# Patient Record
Sex: FEMALE | Race: WHITE | Employment: UNEMPLOYED | URBAN - METROPOLITAN AREA
[De-identification: names, ages, dates, MRNs, and addresses within clinical notes are randomized per-mention and may not be internally consistent; named-entity substitution may affect disease eponyms.]

---

## 2017-07-21 ENCOUNTER — HOSPITAL ENCOUNTER (OUTPATIENT)
Dept: SURGERY | Age: 57
Discharge: HOME | End: 2017-07-21
Payer: MEDICARE

## 2017-07-21 VITALS
HEIGHT: 67 IN | HEART RATE: 84 BPM | TEMPERATURE: 98.1 F | DIASTOLIC BLOOD PRESSURE: 63 MMHG | SYSTOLIC BLOOD PRESSURE: 96 MMHG | OXYGEN SATURATION: 95 % | WEIGHT: 244 LBS | BODY MASS INDEX: 38.3 KG/M2 | RESPIRATION RATE: 14 BRPM

## 2017-07-21 LAB
EST. AVERAGE GLUCOSE BLD GHB EST-MCNC: 123 MG/DL
GLUCOSE BLD STRIP.AUTO-MCNC: 103 MG/DL (ref 65–100)
HBA1C MFR BLD: 5.9 % (ref 4.8–6)
HGB BLD-MCNC: 14.1 G/DL (ref 11.7–15.4)
POTASSIUM SERPL-SCNC: 3.6 MMOL/L (ref 3.5–5.1)

## 2017-07-21 PROCEDURE — 85018 HEMOGLOBIN: CPT | Performed by: ANESTHESIOLOGY

## 2017-07-21 PROCEDURE — 84132 ASSAY OF SERUM POTASSIUM: CPT | Performed by: ANESTHESIOLOGY

## 2017-07-21 PROCEDURE — 82962 GLUCOSE BLOOD TEST: CPT

## 2017-07-21 PROCEDURE — 83036 HEMOGLOBIN GLYCOSYLATED A1C: CPT | Performed by: ORTHOPAEDIC SURGERY

## 2017-07-21 RX ORDER — AZITHROMYCIN 250 MG/1
250 TABLET, FILM COATED ORAL DAILY
COMMUNITY
End: 2017-08-17 | Stop reason: CLARIF

## 2017-07-21 NOTE — PERIOP NOTES
Patient verified name, , and surgery as listed in [facility]. TYPE  CASE:1b  Orders per surgeon: yes Received  Labs per surgeon:[id number]: results -  Labs per anesthesia protocol: potassium,hgb, poc glucose : results -  EKG  :  na    Incentive spirometer given with instructions on use. Patient provided with handouts including guide to surgery , transfusions, pain management and hand hygiene for the family and community. Pt verbalizes understanding of all pre-op instructions . Instructed that family must be present in building at all times. Nothing to eat or drink after midnight the night prior to surgery. hibiclens and instructions given per hospital policy. Instructed patient to continue  previous medications as prescribed prior to surgery and  to take the following medications the day of surgery according to anesthesia guidelines : advair,albuterol,klonopin as needed,gabapentin,ms continor percocet,omeprazole,effexor       Original medication prescription bottles none visualized during patient appointment. Continue all previous medications unless otherwise directed. Instructed patient to hold  the following medications prior to surgery: none      Patient verbalized understanding of all instructions and provided all medical/health information to the best of their ability.

## 2017-07-21 NOTE — H&P
Chief Compliant:  Back pain    History of present illness: This is a very pleasant 62year old patient who presents with a Four-month history of back pain with infrequent radiation to the buttocks or lower extremity. The onset of the symptoms was rather Just before Easter she had thoracic pain. She sees Dr. Larry Beavers for pain management for some chronic low back pain. He did x-ray of the thoracic spine and this revealed a T6 compression fracture. This was confirmed with an MRI scan of the lumbar spine. He placed her in a TLSO brace and treated this with pain medicine increased her morphine to 30 mg. Pain worsened. Repeat MRI scan and x-rays revealed a new T5 compression fracture as well. This last MRI was in June. Pain has persisted, patient fell in the bathtub 2 weeks ago. Her pain worsened. She comes in today for further evaluation. She denies radicular leg pain any worse since her recent falls. She does have some chronic lower back pain. Denies bladder or bowel incontinence. .   . Pain is rated 10/10 on the Visual Analog Scale. Thus far, the patient has tried morphine, brace, Neurontin. PMHx/PSHx/Social History/Medications/Allergies are listed and have been reviewed. Review of systems was noted. Pertinent positives and negatives were discussed with the patient particularly those that related to musculoskeletal complaints. Nonorthopedic complaints were directed to the primary care physician. Medications:  ????? ClonazePAM (2 MG);Diclofenac Sodium (50 MG); Fluconazole (150 MG);Gabapentin (600 MG); Levocetirizine Dihydrochloride (5 MG);MetFORMIN HCl (500 MG); Morphine Sulfate ER (30 MG); Omeprazole (20 MG); Oxycodone-Acetaminophen ( MG); Simvastatin (40 MG); TiZANidine HCl (4 MG); Venlafaxine HCl ER (150 MG)    Allergies:  ????? Doxycycline;TraMADol HCl    Physical Exam:  GENERAL: Well developed, well-nourished adult in no acute distress.  Patient is appropriately conversant  EYES:  Pink conjunctivae, Sclera clear. No ptosis. ENT:  Nose and ears appear normal.  Mucous membranes moist   NECK:  Non-tender. No masses. Full range of motion. RESPIRATORY:  Normal respiratory effort. Lungs are crackles bilateral bases. CARDIOVASCULAR:  Pulse regular. No peripheral edema. Audible S1S2. No carotid bruits. The feet are warm with good capillary refill and palpable pedal pulses. GI:  Abdomen soft, non-tender, non-distended. SKIN:  No visible rashes, ulcers or lesions. Normal turgor and temperature   EXTREMITIES:  Warm and well perfused. No cyanosis or clubbing. MSK:   Examination of the thoracic spine reveals kyphosis or coronal plane deformity. Flexion is limited by pain more so than extension. There is significant tenderness to palpation along the spinous processes or paraspinal musculature. The patient ambulates with a mildly antalgic gait. She is in a wheelchair due to pain. Straight leg testing reproduces some back pain. There is minimal hip irritability with internal or external rotation bilaterally. NEURO:Sensory testing reveals intact sensation to light touch and in the distribution of the L3-S1 dermatomes bilaterally. Reflexes   Right Left   Quadriceps (L4) 2 2   Achilles (S1) 2 2     Ankle jerk is negative for clonus    Strength testing in the lower extremity reveals the following based on the 5 point grading scale:     HF (L2) H Ab (L5) KE (L3/4) ADF (L4) EHL (L5) A Ev (S1) APF (S1)   Right 5 5 5 5 5 5 5   Left 5 5 5 5 5 5 5     The feet are warm with good capillary refill and palpable pedal pulses. Radiographic Studies:    X-rays including AP and lateral views of the thoracic spine were reviewed and reveal compression deformity T5, T6 and T11. The T11 fracture was not present on the previous x-rays that were from June. Radiographic impression: Compression fracture at T5, T6 and T11.     MRI spine, report and images independently reviewed: Compression fracture at T5 and T6. There was noted fracture at T11 at this time. .    Assessment/Plan: This patients clinical history and physical exam is consistent with a compression fracture of T5, T6 and T11. T5 and T6 showed edema on the MRI scan. She is now had a new T11 fracture as well. We went over the treatment options as follows. I told her that the natural history of this condition is slow resolution of symptoms over a several month period. She could opt to treat this with symptomatic care including pain management. She could also try a brace. With either of these measures, the fracture would be expected to heal in its current position without restoration of height or deformity. She has currently felt these treatment options and she continues to fracture. Alternatively, she could consider a kyphoplasty. The benefit of the kyphoplasty is that her pain would subside almost immediately, and there is a good possibility of partial restoration of vertebral shape. The downside is the fact that she has to undergo surgery, and the mismatch of the hardness of the cement with the hardness of the osteoporotic adjacent vertebrae. This could theoretically predispose her to an adjacent level fracture. ????? We discussed the procedure, risks and benefits in detail. The procedure will be under general anesthesia. Dual C-arms will be used to locate the compression fracture. We will make small incisions just lateral to the compression fracture. A small trocar will be guided into the pedicles. A bone biopsy will be taken. We will then insert and inflate the orthopeadic balloon which will create a cavity in the bone and attempt to raise the collapsed vertebra and return it closer to its normal position. Once the vertebra is lifted, the balloon is deflated and removed. The remaining void in the vertebral body will be filled with bone cement to support the surrounding bone and prevent further collapse.  We will then close the incisions, and the patient will go to the recovery room and then stay in the hospital for observation. Patients are usually discharged within 24 hours. Risks are as follows: Risk of death, heart attack, stroke, bleeding, transfusion, nerve injury, paralysis, dural tear, blood clots, pulmonary embolism, and infection. There is also risk that the cement can leak and migrate to the lung or into blood vessels. There is risk of pneumothorax and blood vessel injury  The patient understands these risks and will let me know how she would like to proceed. The procedure that would be most beneficial here is a kyphoplasty of T5, T6 and T12.      ????? ????? Electronically Signed By Aishwarya Patterson PA-C on 7/18/2017        This document was prepared using [employer] voice recognition software. As such, there may be transcription error due to software translation.

## 2017-07-23 ENCOUNTER — ANESTHESIA EVENT (OUTPATIENT)
Dept: SURGERY | Age: 57
End: 2017-07-23
Payer: MEDICARE

## 2017-07-24 ENCOUNTER — APPOINTMENT (OUTPATIENT)
Dept: GENERAL RADIOLOGY | Age: 57
End: 2017-07-24
Attending: ORTHOPAEDIC SURGERY
Payer: MEDICARE

## 2017-07-24 ENCOUNTER — HOSPITAL ENCOUNTER (OUTPATIENT)
Age: 57
Setting detail: OUTPATIENT SURGERY
Discharge: HOME | End: 2017-07-24
Attending: ORTHOPAEDIC SURGERY | Admitting: ORTHOPAEDIC SURGERY
Payer: MEDICARE

## 2017-07-24 ENCOUNTER — ANESTHESIA (OUTPATIENT)
Dept: SURGERY | Age: 57
End: 2017-07-24
Payer: MEDICARE

## 2017-07-24 VITALS
RESPIRATION RATE: 16 BRPM | HEIGHT: 67 IN | HEART RATE: 90 BPM | OXYGEN SATURATION: 85 % | BODY MASS INDEX: 38.21 KG/M2 | WEIGHT: 243.44 LBS | SYSTOLIC BLOOD PRESSURE: 144 MMHG | TEMPERATURE: 98 F | DIASTOLIC BLOOD PRESSURE: 82 MMHG

## 2017-07-24 PROBLEM — M80.08XA VERTEBRAL FRACTURE, OSTEOPOROTIC (HCC): Status: ACTIVE | Noted: 2017-07-24

## 2017-07-24 LAB — GLUCOSE BLD STRIP.AUTO-MCNC: 103 MG/DL (ref 65–100)

## 2017-07-24 PROCEDURE — 77030034843 HC TAMP SPN BN INFL EXP II KYPH -H: Performed by: ORTHOPAEDIC SURGERY

## 2017-07-24 PROCEDURE — 77030007884 HC KT SPN FX KYPH -I2: Performed by: ORTHOPAEDIC SURGERY

## 2017-07-24 PROCEDURE — C1713 ANCHOR/SCREW BN/BN,TIS/BN: HCPCS | Performed by: ORTHOPAEDIC SURGERY

## 2017-07-24 PROCEDURE — 74011250636 HC RX REV CODE- 250/636: Performed by: ANESTHESIOLOGY

## 2017-07-24 PROCEDURE — 76060000035 HC ANESTHESIA 2 TO 2.5 HR: Performed by: ORTHOPAEDIC SURGERY

## 2017-07-24 PROCEDURE — 74011000250 HC RX REV CODE- 250: Performed by: ORTHOPAEDIC SURGERY

## 2017-07-24 PROCEDURE — 77030008477 HC STYL SATN SLP COVD -A: Performed by: ANESTHESIOLOGY

## 2017-07-24 PROCEDURE — 77030032490 HC SLV COMPR SCD KNE COVD -B: Performed by: ORTHOPAEDIC SURGERY

## 2017-07-24 PROCEDURE — 76010000131 HC OR TIME 2 TO 2.5 HR: Performed by: ORTHOPAEDIC SURGERY

## 2017-07-24 PROCEDURE — 74011000250 HC RX REV CODE- 250

## 2017-07-24 PROCEDURE — 88311 DECALCIFY TISSUE: CPT | Performed by: ORTHOPAEDIC SURGERY

## 2017-07-24 PROCEDURE — 74011250637 HC RX REV CODE- 250/637: Performed by: ANESTHESIOLOGY

## 2017-07-24 PROCEDURE — 74011250637 HC RX REV CODE- 250/637

## 2017-07-24 PROCEDURE — 88365 INSITU HYBRIDIZATION (FISH): CPT

## 2017-07-24 PROCEDURE — 74011636320 HC RX REV CODE- 636/320: Performed by: ORTHOPAEDIC SURGERY

## 2017-07-24 PROCEDURE — 88342 IMHCHEM/IMCYTCHM 1ST ANTB: CPT | Performed by: ORTHOPAEDIC SURGERY

## 2017-07-24 PROCEDURE — 74011250636 HC RX REV CODE- 250/636

## 2017-07-24 PROCEDURE — 76210000021 HC REC RM PH II 0.5 TO 1 HR: Performed by: ORTHOPAEDIC SURGERY

## 2017-07-24 PROCEDURE — 82962 GLUCOSE BLOOD TEST: CPT

## 2017-07-24 PROCEDURE — 88364 INSITU HYBRIDIZATION (FISH): CPT

## 2017-07-24 PROCEDURE — 77030008703 HC TU ET UNCUF COVD -A: Performed by: ANESTHESIOLOGY

## 2017-07-24 PROCEDURE — 77030011218 HC DEV BIOP BN KYPH -C: Performed by: ORTHOPAEDIC SURGERY

## 2017-07-24 PROCEDURE — 77030019908 HC STETH ESOPH SIMS -A: Performed by: ANESTHESIOLOGY

## 2017-07-24 PROCEDURE — 76210000016 HC OR PH I REC 1 TO 1.5 HR: Performed by: ORTHOPAEDIC SURGERY

## 2017-07-24 PROCEDURE — 72070 X-RAY EXAM THORAC SPINE 2VWS: CPT

## 2017-07-24 PROCEDURE — 74011250636 HC RX REV CODE- 250/636: Performed by: ORTHOPAEDIC SURGERY

## 2017-07-24 PROCEDURE — 77030020782 HC GWN BAIR PAWS FLX 3M -B: Performed by: ANESTHESIOLOGY

## 2017-07-24 PROCEDURE — 77030026359 HC KT XPNDR II KYPH -I2: Performed by: ORTHOPAEDIC SURGERY

## 2017-07-24 PROCEDURE — 88341 IMHCHEM/IMCYTCHM EA ADD ANTB: CPT | Performed by: ORTHOPAEDIC SURGERY

## 2017-07-24 PROCEDURE — 77030019940 HC BLNKT HYPOTHRM STRY -B: Performed by: ANESTHESIOLOGY

## 2017-07-24 PROCEDURE — 88305 TISSUE EXAM BY PATHOLOGIST: CPT | Performed by: ORTHOPAEDIC SURGERY

## 2017-07-24 PROCEDURE — 77030018836 HC SOL IRR NACL ICUM -A: Performed by: ORTHOPAEDIC SURGERY

## 2017-07-24 DEVICE — BONE CEMENT CX01B KYPHON XPEDE W MXR US
Type: IMPLANTABLE DEVICE | Site: SPINE THORACIC | Status: FUNCTIONAL
Brand: KYPHON® XPEDE™ BONE CEMENT AND KYPHON® MIXER PACK

## 2017-07-24 RX ORDER — MIDAZOLAM HYDROCHLORIDE 1 MG/ML
2 INJECTION, SOLUTION INTRAMUSCULAR; INTRAVENOUS
Status: DISCONTINUED | OUTPATIENT
Start: 2017-07-24 | End: 2017-07-24 | Stop reason: HOSPADM

## 2017-07-24 RX ORDER — GLYCOPYRROLATE 0.2 MG/ML
INJECTION INTRAMUSCULAR; INTRAVENOUS AS NEEDED
Status: DISCONTINUED | OUTPATIENT
Start: 2017-07-24 | End: 2017-07-24 | Stop reason: HOSPADM

## 2017-07-24 RX ORDER — BUPIVACAINE HYDROCHLORIDE AND EPINEPHRINE 5; 5 MG/ML; UG/ML
INJECTION, SOLUTION EPIDURAL; INTRACAUDAL; PERINEURAL AS NEEDED
Status: DISCONTINUED | OUTPATIENT
Start: 2017-07-24 | End: 2017-07-24 | Stop reason: HOSPADM

## 2017-07-24 RX ORDER — ROCURONIUM BROMIDE 10 MG/ML
INJECTION, SOLUTION INTRAVENOUS AS NEEDED
Status: DISCONTINUED | OUTPATIENT
Start: 2017-07-24 | End: 2017-07-24 | Stop reason: HOSPADM

## 2017-07-24 RX ORDER — FAMOTIDINE 20 MG/1
20 TABLET, FILM COATED ORAL ONCE
Status: COMPLETED | OUTPATIENT
Start: 2017-07-24 | End: 2017-07-24

## 2017-07-24 RX ORDER — HYDROCODONE BITARTRATE AND ACETAMINOPHEN 5; 325 MG/1; MG/1
1 TABLET ORAL AS NEEDED
Status: DISCONTINUED | OUTPATIENT
Start: 2017-07-24 | End: 2017-07-24 | Stop reason: HOSPADM

## 2017-07-24 RX ORDER — SODIUM CHLORIDE, SODIUM LACTATE, POTASSIUM CHLORIDE, CALCIUM CHLORIDE 600; 310; 30; 20 MG/100ML; MG/100ML; MG/100ML; MG/100ML
150 INJECTION, SOLUTION INTRAVENOUS CONTINUOUS
Status: DISCONTINUED | OUTPATIENT
Start: 2017-07-24 | End: 2017-07-24 | Stop reason: HOSPADM

## 2017-07-24 RX ORDER — PROPOFOL 10 MG/ML
INJECTION, EMULSION INTRAVENOUS AS NEEDED
Status: DISCONTINUED | OUTPATIENT
Start: 2017-07-24 | End: 2017-07-24 | Stop reason: HOSPADM

## 2017-07-24 RX ORDER — SODIUM CHLORIDE 0.9 % (FLUSH) 0.9 %
5-10 SYRINGE (ML) INJECTION AS NEEDED
Status: DISCONTINUED | OUTPATIENT
Start: 2017-07-24 | End: 2017-07-24 | Stop reason: HOSPADM

## 2017-07-24 RX ORDER — SODIUM CHLORIDE 0.9 % (FLUSH) 0.9 %
5-10 SYRINGE (ML) INJECTION EVERY 8 HOURS
Status: DISCONTINUED | OUTPATIENT
Start: 2017-07-24 | End: 2017-07-24 | Stop reason: HOSPADM

## 2017-07-24 RX ORDER — FENTANYL CITRATE 50 UG/ML
INJECTION, SOLUTION INTRAMUSCULAR; INTRAVENOUS AS NEEDED
Status: DISCONTINUED | OUTPATIENT
Start: 2017-07-24 | End: 2017-07-24 | Stop reason: HOSPADM

## 2017-07-24 RX ORDER — FENTANYL CITRATE 50 UG/ML
100 INJECTION, SOLUTION INTRAMUSCULAR; INTRAVENOUS ONCE
Status: DISCONTINUED | OUTPATIENT
Start: 2017-07-24 | End: 2017-07-24 | Stop reason: HOSPADM

## 2017-07-24 RX ORDER — ONDANSETRON 2 MG/ML
INJECTION INTRAMUSCULAR; INTRAVENOUS AS NEEDED
Status: DISCONTINUED | OUTPATIENT
Start: 2017-07-24 | End: 2017-07-24 | Stop reason: HOSPADM

## 2017-07-24 RX ORDER — LIDOCAINE HYDROCHLORIDE 10 MG/ML
0.1 INJECTION INFILTRATION; PERINEURAL AS NEEDED
Status: DISCONTINUED | OUTPATIENT
Start: 2017-07-24 | End: 2017-07-24 | Stop reason: HOSPADM

## 2017-07-24 RX ORDER — CEFAZOLIN SODIUM IN 0.9 % NACL 2 G/50 ML
2 INTRAVENOUS SOLUTION, PIGGYBACK (ML) INTRAVENOUS ONCE
Status: COMPLETED | OUTPATIENT
Start: 2017-07-24 | End: 2017-07-24

## 2017-07-24 RX ORDER — HYDROMORPHONE HYDROCHLORIDE 2 MG/ML
0.5 INJECTION, SOLUTION INTRAMUSCULAR; INTRAVENOUS; SUBCUTANEOUS
Status: DISCONTINUED | OUTPATIENT
Start: 2017-07-24 | End: 2017-07-24 | Stop reason: HOSPADM

## 2017-07-24 RX ORDER — SODIUM CHLORIDE 9 MG/ML
50 INJECTION, SOLUTION INTRAVENOUS CONTINUOUS
Status: DISCONTINUED | OUTPATIENT
Start: 2017-07-24 | End: 2017-07-24 | Stop reason: HOSPADM

## 2017-07-24 RX ORDER — ACETAMINOPHEN 10 MG/ML
INJECTION, SOLUTION INTRAVENOUS AS NEEDED
Status: DISCONTINUED | OUTPATIENT
Start: 2017-07-24 | End: 2017-07-24 | Stop reason: HOSPADM

## 2017-07-24 RX ORDER — ACETAMINOPHEN 500 MG
1000 TABLET ORAL
Status: DISCONTINUED | OUTPATIENT
Start: 2017-07-24 | End: 2017-07-24 | Stop reason: HOSPADM

## 2017-07-24 RX ORDER — LIDOCAINE HYDROCHLORIDE 20 MG/ML
INJECTION, SOLUTION EPIDURAL; INFILTRATION; INTRACAUDAL; PERINEURAL AS NEEDED
Status: DISCONTINUED | OUTPATIENT
Start: 2017-07-24 | End: 2017-07-24 | Stop reason: HOSPADM

## 2017-07-24 RX ORDER — ALBUTEROL SULFATE 90 UG/1
AEROSOL, METERED RESPIRATORY (INHALATION) AS NEEDED
Status: DISCONTINUED | OUTPATIENT
Start: 2017-07-24 | End: 2017-07-24 | Stop reason: HOSPADM

## 2017-07-24 RX ORDER — NEOSTIGMINE METHYLSULFATE 1 MG/ML
INJECTION INTRAVENOUS AS NEEDED
Status: DISCONTINUED | OUTPATIENT
Start: 2017-07-24 | End: 2017-07-24 | Stop reason: HOSPADM

## 2017-07-24 RX ADMIN — FENTANYL CITRATE 50 MCG: 50 INJECTION, SOLUTION INTRAMUSCULAR; INTRAVENOUS at 16:45

## 2017-07-24 RX ADMIN — SODIUM CHLORIDE, SODIUM LACTATE, POTASSIUM CHLORIDE, AND CALCIUM CHLORIDE: 600; 310; 30; 20 INJECTION, SOLUTION INTRAVENOUS at 17:07

## 2017-07-24 RX ADMIN — NEOSTIGMINE METHYLSULFATE 3 MG: 1 INJECTION INTRAVENOUS at 17:09

## 2017-07-24 RX ADMIN — ACETAMINOPHEN 1000 MG: 10 INJECTION, SOLUTION INTRAVENOUS at 17:05

## 2017-07-24 RX ADMIN — GLYCOPYRROLATE 0.4 MG: 0.2 INJECTION INTRAMUSCULAR; INTRAVENOUS at 17:09

## 2017-07-24 RX ADMIN — FENTANYL CITRATE 25 MCG: 50 INJECTION, SOLUTION INTRAMUSCULAR; INTRAVENOUS at 15:27

## 2017-07-24 RX ADMIN — FENTANYL CITRATE 50 MCG: 50 INJECTION, SOLUTION INTRAMUSCULAR; INTRAVENOUS at 17:00

## 2017-07-24 RX ADMIN — ROCURONIUM BROMIDE 5 MG: 10 INJECTION, SOLUTION INTRAVENOUS at 16:18

## 2017-07-24 RX ADMIN — ONDANSETRON 4 MG: 2 INJECTION INTRAMUSCULAR; INTRAVENOUS at 17:07

## 2017-07-24 RX ADMIN — FAMOTIDINE 20 MG: 20 TABLET ORAL at 13:36

## 2017-07-24 RX ADMIN — ALBUTEROL SULFATE 3 PUFF: 90 AEROSOL, METERED RESPIRATORY (INHALATION) at 17:21

## 2017-07-24 RX ADMIN — PROPOFOL 200 MG: 10 INJECTION, EMULSION INTRAVENOUS at 15:27

## 2017-07-24 RX ADMIN — HYDROCODONE BITARTRATE AND ACETAMINOPHEN 1 TABLET: 5; 325 TABLET ORAL at 18:02

## 2017-07-24 RX ADMIN — CEFAZOLIN 2 G: 1 INJECTION, POWDER, FOR SOLUTION INTRAMUSCULAR; INTRAVENOUS; PARENTERAL at 15:24

## 2017-07-24 RX ADMIN — SODIUM CHLORIDE, SODIUM LACTATE, POTASSIUM CHLORIDE, AND CALCIUM CHLORIDE 150 ML/HR: 600; 310; 30; 20 INJECTION, SOLUTION INTRAVENOUS at 13:36

## 2017-07-24 RX ADMIN — FENTANYL CITRATE 100 MCG: 50 INJECTION, SOLUTION INTRAMUSCULAR; INTRAVENOUS at 16:22

## 2017-07-24 RX ADMIN — FENTANYL CITRATE 25 MCG: 50 INJECTION, SOLUTION INTRAMUSCULAR; INTRAVENOUS at 15:25

## 2017-07-24 RX ADMIN — LIDOCAINE HYDROCHLORIDE 100 MG: 20 INJECTION, SOLUTION EPIDURAL; INFILTRATION; INTRACAUDAL; PERINEURAL at 15:27

## 2017-07-24 RX ADMIN — ROCURONIUM BROMIDE 35 MG: 10 INJECTION, SOLUTION INTRAVENOUS at 15:28

## 2017-07-24 RX ADMIN — FENTANYL CITRATE 50 MCG: 50 INJECTION, SOLUTION INTRAMUSCULAR; INTRAVENOUS at 15:40

## 2017-07-24 NOTE — ANESTHESIA POSTPROCEDURE EVALUATION
Post-Anesthesia Evaluation and Assessment    Patient: Rosales Feldman MRN: [de-identified]  SSN: [id number]    YOB: 1960  Age: 62 y.o. Sex: female       Cardiovascular Function/Vital Signs  Visit Vitals    /78    Pulse 83    Temp 36.7 °C (98.1 °F)    Resp 20    Ht 5' 7\" (1.702 m)    Wt 110.4 kg (243 lb 7 oz)    SpO2 91%    BMI 38.13 kg/m2       Patient is status post general anesthesia for Procedure(s):  KYPHOPLASTY T5,  T6, T11. Nausea/Vomiting: None    Postoperative hydration reviewed and adequate. Pain:  Pain Scale 1: Visual (07/24/17 1802)  Pain Intensity 1: 3 (07/24/17 1802)   Managed    Neurological Status:   Neuro (WDL): Exceptions to WDL (07/24/17 1730)  Neuro  Neurologic State: Drowsy (07/24/17 1730)  LUE Motor Response: Purposeful;Spontaneous  (07/24/17 1730)  LLE Motor Response: Spontaneous ; Purposeful (07/24/17 1730)  RUE Motor Response: Spontaneous ; Purposeful (07/24/17 1730)  RLE Motor Response: Purposeful;Spontaneous  (07/24/17 1730)   At baseline    Mental Status and Level of Consciousness: Arousable    Pulmonary Status:   O2 Device: Nasal cannula (07/24/17 1730)   Adequate oxygenation and airway patent    Complications related to anesthesia: None    Post-anesthesia assessment completed. No concerns. Patient with perioperative decreased o2 sats. Encouraged to cough and deep breath. Given an incentive spirometer and strongly encouraged her to quit smoking.     Signed By: Melida Mead MD     July 24, 2017

## 2017-07-24 NOTE — DISCHARGE INSTRUCTIONS
KYPHOPLASTY DISCHARGE INSTRUCTIONS    General Information: This procedure is done to help with the back pain that is associated with compression fractures in the spine. The kyphoplasty involves placing a balloon into the space of the vertebrae that is fractured, blowing up the balloon, therefore realigning the broken pieces of bone, and then injecting cement into the space to strengthen the vertebrae. The pain experienced from compression fractures is caused by the vertebrae not being stabilized. The cement stabilizes the bone, therefore reducing the pain. Home Care Instructions: You can resume your regular diet and medication regimen. Do not drink alcohol, drive, or make any important legal decisions in the next 24 hours. Do not lift anything heavier than a gallon of milk, or do anything strenuous for the next 24 hours. You will notice a dressing on your lower back after your procedure. This dressing can be removed in 24 hours. Showering is acceptable in 24 hours, but you should refrain from tub baths or swimming for 5 days. Call If:     You should call your Physician if you have any bleeding other than a small spot on your bandage. Call if you have any signs of infection, fever, or increased pain at the site. Call if you should have new or worsening pain in your back, or if you lose control of your bladder or bowel. Any tingling or loss of feeling or movement in your legs should also be reported. Follow-Up Instructions: Please see your ordering doctor as he has requested.      To Reach Us:  [employer] [phone]      After general anesthesia or intravenous sedation, for 24 hours or while taking prescription Narcotics:  · Limit your activities  · Do not drive and operate hazardous machinery  · Do not make important personal or business decisions  · Do  not drink alcoholic beverages  · If you have not urinated within 8 hours after discharge, please contact your surgeon on call.    *  Please give a list of your current medications to your Primary Care Provider. *  Please update this list whenever your medications are discontinued, doses are      changed, or new medications (including over-the-counter products) are added. *  Please carry medication information at all times in case of emergency situations. These are general instructions for a healthy lifestyle:  No smoking/ No tobacco products/ Avoid exposure to second hand smoke  Surgeon General's Warning:  Quitting smoking now greatly reduces serious risk to your health. Obesity, smoking, and sedentary lifestyle greatly increases your risk for illness  A healthy diet, regular physical exercise & weight monitoring are important for maintaining a healthy lifestyle    You may be retaining fluid if you have a history of heart failure or if you experience any of the following symptoms:  Weight gain of 3 pounds or more overnight or 5 pounds in a week, increased swelling in our hands or feet or shortness of breath while lying flat in bed. Please call your doctor as soon as you notice any of these symptoms; do not wait until your next office visit. Recognize signs and symptoms of STROKE:    F-face looks uneven    A-arms unable to move or move unevenly    S-speech slurred or non-existent    T-time-call 911 as soon as signs and symptoms begin-DO NOT go       Back to bed or wait to see if you get better-TIME IS BRAIN.

## 2017-07-24 NOTE — IP AVS SNAPSHOT
Joshua Wallace 
 
 
 [address] [facility] [address] 
[phone] Patient: Heriberto Quiros MRN: [id number] KDO:[id number] You are allergic to the following Allergen Reactions Chantix (Varenicline) Other (comments) Severe dreams Darvocet A500 (Propoxyphene N-Acetaminophen) Shortness of Breath Doxycycline Nausea and Vomiting Naprosyn (Naproxen) Nausea Only Prednisolone Shortness of Breath Chest pain Prednisone Shortness of Breath Ultram (Tramadol) Shortness of Breath Recent Documentation Height Weight BMI OB Status Smoking Status 1.702 m 110.4 kg 38.13 kg/m2 Hysterectomy Current Every Day Smoker Emergency Contacts Name Discharge Info Relation Home Work Mobile Benigno Coppola  Spouse [3] [phone] Chaparrita Montalvo  Child [2] [phone] About your hospitalization You were admitted on:  July 24, 2017 [facility] You were discharged on:  July 24, 2017 Unit phone number:  [phone] Why you were hospitalized Your primary diagnosis was:  Vertebral Fracture, Osteoporotic (Hcc) Providers Seen During Your Hospitalizations Provider Role Specialty Primary office phone Jun Menchaca MD Attending Provider Orthopedic Surgery [phone] Your Primary Care Physician (PCP) Primary Care Physician Office Phone Office Fax Elvira Sanders [phone] Follow-up Information Follow up With Details Comments Contact Info Violeta Maldonado PA-C   [address]  [address] [place] [address] 
[phone] Jun Menchaca MD  Follow up in 2 weeks or previously scheduled follow up appt. Cristobal [phone] [facility] [address] [phone] Current Discharge Medication List  
  
CONTINUE these medications which have CHANGED Dose & Instructions Dispensing Information Comments Morning Noon Evening Bedtime  
 omeprazole 20 mg capsule Commonly known as:  PRILOSEC What changed:  See the new instructions. Your last dose was: Your next dose is: TAKE 1 CAPSULE BY MOUTH ONCE DAILY Quantity:  90 Cap Refills:  1  
     
   
   
   
  
 simvastatin 40 mg tablet Commonly known as:  ZOCOR What changed:  See the new instructions. Your last dose was: Your next dose is: TAKE 1 TABLET BY MOUTH EVERY EVENING Quantity:  30 Tab Refills:  3 CONTINUE these medications which have NOT CHANGED Dose & Instructions Dispensing Information Comments Morning Noon Evening Bedtime ADVAIR DISKUS 250-50 mcg/dose diskus inhaler Generic drug:  fluticasone-salmeterol Your last dose was: Your next dose is:    
   
   
 Dose:  1 Puff 1 Puff two (2) times a day. Refills:  0  
     
   
   
   
  
 clonazePAM 2 mg tablet Commonly known as:  Malurilee Mortimer Your last dose was: Your next dose is:    
   
   
 Dose:  2 mg Take 2 mg by mouth as needed. Refills:  0  
     
   
   
   
  
 EFFEXOR  mg capsule Generic drug:  venlafaxine-SR Your last dose was: Your next dose is:    
   
   
 Dose:  150 mg Take 150 mg by mouth every morning. Refills:  0  
     
   
   
   
  
 gabapentin 600 mg tablet Commonly known as:  NEURONTIN Your last dose was: Your next dose is:    
   
   
 Dose:  600 mg Take 600 mg by mouth three (3) times daily. Refills:  0  
     
   
   
   
  
 glucose blood VI test strips strip Commonly known as:  Iggy Rg Your last dose was: Your next dose is:    
   
   
 Daily sugar checks Quantity:  100 Each Refills:  3 Lancets Misc Commonly known as: One Touch Federico Severance Your last dose was: Your next dose is:    
   
   
 Daily sugar checks Quantity:  100 Each Refills:  3 LASIX 20 mg tablet Generic drug:  furosemide Your last dose was: Your next dose is:    
   
   
 Dose:  20 mg Take 20 mg by mouth as needed. Refills:  0  
     
   
   
   
  
 levocetirizine 5 mg tablet Commonly known as:  Christen Romberg Your last dose was: Your next dose is:    
   
   
 Dose:  5 mg Take 5 mg by mouth nightly. Refills:  5  
     
   
   
   
  
 linaclotide 290 mcg Cap capsule Commonly known as:  Joellen Henry Your last dose was: Your next dose is:    
   
   
 Dose:  290 mcg Take 1 Cap by mouth Daily (before breakfast). Quantity:  30 Cap Refills:  5  
     
   
   
   
  
 morphine CR 15 mg CR tablet Commonly known as:  MS CONTIN Your last dose was: Your next dose is:    
   
   
 Dose:  30 mg Take 30 mg by mouth every twelve (12) hours. Refills:  0  
     
   
   
   
  
 NASONEX 50 mcg/actuation nasal spray Generic drug:  mometasone Your last dose was: Your next dose is:    
   
   
  Refills:  0  
     
   
   
   
  
 nystatin topical cream  
Commonly known as:  MYCOSTATIN Your last dose was: Your next dose is:    
   
   
 Apply  to affected area two (2) times a day. Quantity:  30 g Refills:  7  
     
   
   
   
  
 oxyCODONE-acetaminophen  mg per tablet Commonly known as:  PERCOCET 10 Your last dose was: Your next dose is:    
   
   
 Dose:  1 Tab Take 1 Tab by mouth every four (4) hours as needed. Refills:  0 PROAIR HFA 90 mcg/actuation inhaler Generic drug:  albuterol Your last dose was: Your next dose is: Take  by inhalation every six (6) hours as needed. Refills:  0  
     
   
   
   
  
 tiZANidine 4 mg tablet Commonly known as:  Velasquez Ebonie Your last dose was: Your next dose is:    
   
   
 Dose:  4 mg Take 4 mg by mouth three (3) times daily as needed. Refills:  0 ZITHROMAX 250 mg tablet Generic drug:  azithromycin Your last dose was: Your next dose is:    
   
   
 Dose:  250 mg Take 250 mg by mouth daily. Refills:  0  
     
   
   
   
  
 zolpidem 10 mg tablet Commonly known as:  AMBIEN Your last dose was: Your next dose is:    
   
   
 Dose:  10 mg  
10 mg nightly as needed. Refills:  0 Discharge Instructions KYPHOPLASTY DISCHARGE INSTRUCTIONS General Information: This procedure is done to help with the back pain that is associated with compression fractures in the spine. The kyphoplasty involves placing a balloon into the space of the vertebrae that is fractured, blowing up the balloon, therefore realigning the broken pieces of bone, and then injecting cement into the space to strengthen the vertebrae. The pain experienced from compression fractures is caused by the vertebrae not being stabilized. The cement stabilizes the bone, therefore reducing the pain. Home Care Instructions: You can resume your regular diet and medication regimen. Do not drink alcohol, drive, or make any important legal decisions in the next 24 hours. Do not lift anything heavier than a gallon of milk, or do anything strenuous for the next 24 hours. You will notice a dressing on your lower back after your procedure. This dressing can be removed in 24 hours. Showering is acceptable in 24 hours, but you should refrain from tub baths or swimming for 5 days. Call If: 
   You should call your Physician if you have any bleeding other than a small spot on your bandage. Call if you have any signs of infection, fever, or increased pain at the site.  Call if you should have new or worsening pain in your back, or if you lose control of your bladder or bowel. Any tingling or loss of feeling or movement in your legs should also be reported. Follow-Up Instructions: Please see your ordering doctor as he has requested. To Reach Us:  [facility] [phone] After general anesthesia or intravenous sedation, for 24 hours or while taking prescription Narcotics: · Limit your activities · Do not drive and operate hazardous machinery · Do not make important personal or business decisions · Do  not drink alcoholic beverages · If you have not urinated within 8 hours after discharge, please contact your surgeon on call. *  Please give a list of your current medications to your Primary Care Provider. *  Please update this list whenever your medications are discontinued, doses are 
    changed, or new medications (including over-the-counter products) are added. *  Please carry medication information at all times in case of emergency situations. These are general instructions for a healthy lifestyle: No smoking/ No tobacco products/ Avoid exposure to second hand smoke Surgeon General's Warning:  Quitting smoking now greatly reduces serious risk to your health. Obesity, smoking, and sedentary lifestyle greatly increases your risk for illness A healthy diet, regular physical exercise & weight monitoring are important for maintaining a healthy lifestyle You may be retaining fluid if you have a history of heart failure or if you experience any of the following symptoms:  Weight gain of 3 pounds or more overnight or 5 pounds in a week, increased swelling in our hands or feet or shortness of breath while lying flat in bed. Please call your doctor as soon as you notice any of these symptoms; do not wait until your next office visit. Recognize signs and symptoms of STROKE: 
 
F-face looks uneven A-arms unable to move or move unevenly S-speech slurred or non-existent T-time-call 911 as soon as signs and symptoms begin-DO NOT go Back to bed or wait to see if you get better-TIME IS BRAIN. Discharge Orders None Introducing [facility]! [facility] introduces [employer] patient portal. Now you can access parts of your medical record, email your doctor's office, and request medication refills online. 1. In your internet browser, go to [email or web address] 2. Click on the First Time User? Click Here link in the Sign In box. You will see the New Member Sign Up page. 3. Enter your [employer] Access Code exactly as it appears below. You will not need to use this code after youve completed the sign-up process. If you do not sign up before the expiration date, you must request a new code. · [employer] Access Code: [email or web address] Expires: 10/16/2017  3:30 PM 
 
4. Enter the last four digits of your Social Security Number (xxxx) and Date of Birth (mm/dd/yyyy) as indicated and click Submit. You will be taken to the next sign-up page. 5. Create a [employer] ID. This will be your [employer] login ID and cannot be changed, so think of one that is secure and easy to remember. 6. Create a [employer] password. You can change your password at any time. 7. Enter your Password Reset Question and Answer. This can be used at a later time if you forget your password. 8. Enter your e-mail address. You will receive e-mail notification when new information is available in [address] 9. Click Sign Up. You can now view and download portions of your medical record. 10. Click the Download Summary menu link to download a portable copy of your medical information. If you have questions, please visit the Frequently Asked Questions section of the [employer] website. Remember, [employer] is NOT to be used for urgent needs. For medical emergencies, dial 911. Now available from your iPhone and Android! General Information Please provide this summary of care documentation to your next provider. Patient Signature:  ____________________________________________________________ Date:  ____________________________________________________________  
  
Rexann Ports Provider Signature:  ____________________________________________________________ Date:  ____________________________________________________________

## 2017-07-24 NOTE — ANESTHESIA PREPROCEDURE EVALUATION
Anesthetic History   No history of anesthetic complications       Comments: Slow awakening.      Review of Systems / Medical History  Patient summary reviewed, nursing notes reviewed and pertinent labs reviewed    Pulmonary    COPD: moderate    Sleep apnea: CPAP  Smoker         Neuro/Psych         Psychiatric history     Cardiovascular                  Exercise tolerance: <4 METS     GI/Hepatic/Renal     GERD: well controlled           Endo/Other        Morbid obesity     Other Findings              Physical Exam    Airway  Mallampati: III    Neck ROM: short neck   Mouth opening: Diminished (comment)     Cardiovascular    Rhythm: regular  Rate: normal         Dental    Dentition: Full upper dentures     Pulmonary      Decreased breath sounds: bilateral  Wheezes:bilateral    Prolonged expiration     Abdominal         Other Findings            Anesthetic Plan    ASA: 4  Anesthesia type: general          Induction: Intravenous  Anesthetic plan and risks discussed with: Family and Patient

## 2017-07-24 NOTE — BRIEF OP NOTE
BRIEF OPERATIVE NOTE    Date of Procedure: 7/24/2017   Preoperative Diagnosis: Crush fracture of vertebra due to [facility] [M80.08XA]  Postoperative Diagnosis: Crush fracture of vertebra due to osteoporosis, initial encounter     Procedure(s):  KYPHOPLASTY T5,  T6, T11  Surgeon(s) and Role:     * Brianne Miranda MD - Primary         Assistant Staff:       Surgical Staff:  Circ-1: David Hernandez RN  Scrub Tech-1: Anupam Henry  Event Time In   Incision Start 1622   Incision Close 1711     Anesthesia: General   Estimated Blood Loss: minimal  Specimens:   ID Type Source Tests Collected by Time Destination   1 : RIGHT T6 BONE  Preservative Bone  Brianne Miranda MD 7/24/2017 1700 Pathology      Findings: fractures   Complications: none  Implants:   Implant Name Type Inv.  Item Serial No.  Lot No. LRB No. Used Action   PACK MIXER BNE CEMENT KYPHON --  - [id number]   PACK MIXER BNE CEMENT Cain Castro --    [id number] N/A 2 Implanted

## 2017-07-24 NOTE — PERIOP NOTES
Per Dr. Joya Summers, patient is okay to be discharged with O2 sats in the mid 80's (patient's baseline).

## 2017-07-25 NOTE — OP NOTES
[address] 65   OPERATIVE REPORT       Name:  Shin Arellano   MR#:  [de-identified]   :  1960   Account #:  [de-identified]   Date of Adm:  2017       DATE OF SURGERY: 2017     PREOPERATIVE DIAGNOSIS: Osteoporotic vertebral compression   fractures at T5, T6 and T11. POSTOPERATIVE DIAGNOSIS: Osteoporotic vertebral compression   fractures at T5 T6 and T11. NAME OF PROCEDURE: Bilateral T11, left-sided T5 and right-sided   T6 kyphoplasty with bone biopsy, and procedure was carried out   using biplanar C-arm fluoroscopy imaging. SURGEON: Nubia Cm. Nallely Salazar MD    ANESTHESIA: GETA. ESTIMATED BLOOD LOSS: Minimal.    FLUIDS: A liter of crystalloid. SPECIMENS: T11 bone biopsy sent to Pathology. INDICATIONS: The patient is a 70-year-old female who has had   some chronic treatment with prednisone and developed   osteoporosis, who has had acute onset of pain starting in    after a minor injury and then she fell again prior to a   evaluation. She had an MRI scan prior to her fall, which showed a   T5 and T6 fracture, and x-rays after her fall showed a new T11   fracture. The patient was already seeing Pain Management for   chronic pain and on very strong narcotics and because of the   added pain was incapacitated, so she is brought for surgical   treatment. PROCEDURE: The patient was brought to the operating room. After   administration of anesthesia, IV antibiotics and placement of   monitoring lines, she was positioned prone on the Mauricio Gentleman frame. Her back was prepped with Betadine and sterilely draped. At this   point, surgeon called a time-out, confirmed the procedure to be   performed, her allergy history and the fact that she had   received her preoperative IV antibiotics. AP and lateral C-arms   were brought in.  Initially we had a very difficult time finding   T5 and T6, but we could easily find T11, there were large   pedicles and eventually we were able to get a good lateral and   AP picture of both T5 and T6. The pedicles at 5 and 6 were very   small and we elected to do a unilateral procedure because of   this and it was done from the left side at T5 and on the right   side at T6. So we had marked the lateral aspect of the pedicle   on the skin at all these levels on AP C-arm picture and then   made a stab wound about a fingerbreadth lateral to it and   inserted our trocars down to the lateral edge of the pedicle and   checking under the AP and lateral views, we inserted the trocars   down through the pedicle, being careful not to breach the medial   cortex and we did obtain a bone biopsy from the right side at   T11. At T5 and T6 we did the unilateral procedure, did not obtain a   bone graft, once again very narrowed pedicles. We then inserted   our inflatable tamps, inflated these and during this time, the   methylmethacrylate was reconstituted and placed into the   insertion devices. We then removed the inflation devices and   inserted the bone fillers into the cannulas, at T11 we did it   bilaterally, got a very good fill, the vertebral body endplates   were flat, no extravasation. At T5 I actually got a very good   filling as well and we did get the cement to extend over to the   right side at T6 initially when we started filling and this was   the most compressed vertebra, very wedge shaped. We got some   extravasation through the superior endplate, so we let it sit   for a while and then we came back and compressed more cement   into the vertebral body, and actually at this time it went   across the superior endplate to the other side and got really   pretty good filling. Then once the cement had hardened, we   removed the bone fillers, checked for tails and none were seen,   so the trocar sleeves were removed. A final AP and lateral C-arm   x-ray were obtained.  We anesthetized all incisions with 0.5%   Marcaine with epinephrine, a total of 30 mL, then closed each   skin incision with a single stitch of 3-0 nylon. Dry sterile   dressings were applied and the patient was then rolled supine   onto the recovery room bed, having tolerated the procedure well.         MD MIL Wong / DERICK   D:  07/24/2017   17:32   T:  07/24/2017   22:30   Job #:  [id number]

## 2017-08-18 ENCOUNTER — ANESTHESIA (OUTPATIENT)
Dept: SURGERY | Age: 57
End: 2017-08-18
Payer: MEDICARE

## 2017-08-18 ENCOUNTER — HOSPITAL ENCOUNTER (OUTPATIENT)
Age: 57
Setting detail: OUTPATIENT SURGERY
Discharge: HOME | End: 2017-08-18
Attending: ORTHOPAEDIC SURGERY | Admitting: ORTHOPAEDIC SURGERY
Payer: MEDICARE

## 2017-08-18 ENCOUNTER — APPOINTMENT (OUTPATIENT)
Dept: GENERAL RADIOLOGY | Age: 57
End: 2017-08-18
Attending: ORTHOPAEDIC SURGERY
Payer: MEDICARE

## 2017-08-18 ENCOUNTER — ANESTHESIA EVENT (OUTPATIENT)
Dept: SURGERY | Age: 57
End: 2017-08-18
Payer: MEDICARE

## 2017-08-18 VITALS
OXYGEN SATURATION: 92 % | RESPIRATION RATE: 16 BRPM | HEIGHT: 67 IN | HEART RATE: 86 BPM | TEMPERATURE: 97.8 F | DIASTOLIC BLOOD PRESSURE: 75 MMHG | SYSTOLIC BLOOD PRESSURE: 134 MMHG | BODY MASS INDEX: 36.45 KG/M2 | WEIGHT: 232.25 LBS

## 2017-08-18 LAB — GLUCOSE BLD STRIP.AUTO-MCNC: 86 MG/DL (ref 65–100)

## 2017-08-18 PROCEDURE — 74011000250 HC RX REV CODE- 250: Performed by: ORTHOPAEDIC SURGERY

## 2017-08-18 PROCEDURE — 74011000250 HC RX REV CODE- 250

## 2017-08-18 PROCEDURE — 77030008477 HC STYL SATN SLP COVD -A: Performed by: ANESTHESIOLOGY

## 2017-08-18 PROCEDURE — 74011250636 HC RX REV CODE- 250/636: Performed by: ORTHOPAEDIC SURGERY

## 2017-08-18 PROCEDURE — 77030028759 HC KT SPN BN TAMP FF KYPH -I2: Performed by: ORTHOPAEDIC SURGERY

## 2017-08-18 PROCEDURE — 82962 GLUCOSE BLOOD TEST: CPT

## 2017-08-18 PROCEDURE — 74011250636 HC RX REV CODE- 250/636: Performed by: ANESTHESIOLOGY

## 2017-08-18 PROCEDURE — 74011636320 HC RX REV CODE- 636/320: Performed by: ORTHOPAEDIC SURGERY

## 2017-08-18 PROCEDURE — 77030008703 HC TU ET UNCUF COVD -A: Performed by: ANESTHESIOLOGY

## 2017-08-18 PROCEDURE — 76060000033 HC ANESTHESIA 1 TO 1.5 HR: Performed by: ORTHOPAEDIC SURGERY

## 2017-08-18 PROCEDURE — 72100 X-RAY EXAM L-S SPINE 2/3 VWS: CPT

## 2017-08-18 PROCEDURE — C1713 ANCHOR/SCREW BN/BN,TIS/BN: HCPCS | Performed by: ORTHOPAEDIC SURGERY

## 2017-08-18 PROCEDURE — 77030011218 HC DEV BIOP BN KYPH -C: Performed by: ORTHOPAEDIC SURGERY

## 2017-08-18 PROCEDURE — 76210000016 HC OR PH I REC 1 TO 1.5 HR: Performed by: ORTHOPAEDIC SURGERY

## 2017-08-18 PROCEDURE — 77030032490 HC SLV COMPR SCD KNE COVD -B: Performed by: ORTHOPAEDIC SURGERY

## 2017-08-18 PROCEDURE — 77030018836 HC SOL IRR NACL ICUM -A: Performed by: ORTHOPAEDIC SURGERY

## 2017-08-18 PROCEDURE — 74011250637 HC RX REV CODE- 250/637: Performed by: ANESTHESIOLOGY

## 2017-08-18 PROCEDURE — 74011250636 HC RX REV CODE- 250/636

## 2017-08-18 PROCEDURE — 76010000160 HC OR TIME 0.5 TO 1 HR INTENSV-TIER 1: Performed by: ORTHOPAEDIC SURGERY

## 2017-08-18 PROCEDURE — 76210000020 HC REC RM PH II FIRST 0.5 HR: Performed by: ORTHOPAEDIC SURGERY

## 2017-08-18 PROCEDURE — 77030020782 HC GWN BAIR PAWS FLX 3M -B: Performed by: ANESTHESIOLOGY

## 2017-08-18 DEVICE — BONE CEMENT CX01B KYPHON XPEDE W MXR US
Type: IMPLANTABLE DEVICE | Site: SPINE LUMBAR | Status: FUNCTIONAL
Brand: KYPHON® XPEDE™ BONE CEMENT AND KYPHON® MIXER PACK

## 2017-08-18 RX ORDER — FAMOTIDINE 20 MG/1
20 TABLET, FILM COATED ORAL ONCE
Status: COMPLETED | OUTPATIENT
Start: 2017-08-18 | End: 2017-08-18

## 2017-08-18 RX ORDER — SODIUM CHLORIDE, SODIUM LACTATE, POTASSIUM CHLORIDE, CALCIUM CHLORIDE 600; 310; 30; 20 MG/100ML; MG/100ML; MG/100ML; MG/100ML
100 INJECTION, SOLUTION INTRAVENOUS CONTINUOUS
Status: DISCONTINUED | OUTPATIENT
Start: 2017-08-18 | End: 2017-08-19 | Stop reason: HOSPADM

## 2017-08-18 RX ORDER — ONDANSETRON 2 MG/ML
INJECTION INTRAMUSCULAR; INTRAVENOUS AS NEEDED
Status: DISCONTINUED | OUTPATIENT
Start: 2017-08-18 | End: 2017-08-18 | Stop reason: HOSPADM

## 2017-08-18 RX ORDER — SODIUM CHLORIDE 0.9 % (FLUSH) 0.9 %
5-10 SYRINGE (ML) INJECTION AS NEEDED
Status: DISCONTINUED | OUTPATIENT
Start: 2017-08-18 | End: 2017-08-18 | Stop reason: HOSPADM

## 2017-08-18 RX ORDER — ACETAMINOPHEN 10 MG/ML
1000 INJECTION, SOLUTION INTRAVENOUS ONCE
Status: COMPLETED | OUTPATIENT
Start: 2017-08-18 | End: 2017-08-18

## 2017-08-18 RX ORDER — CEFAZOLIN SODIUM IN 0.9 % NACL 2 G/50 ML
2 INTRAVENOUS SOLUTION, PIGGYBACK (ML) INTRAVENOUS ONCE
Status: COMPLETED | OUTPATIENT
Start: 2017-08-18 | End: 2017-08-18

## 2017-08-18 RX ORDER — MIDAZOLAM HYDROCHLORIDE 1 MG/ML
2 INJECTION, SOLUTION INTRAMUSCULAR; INTRAVENOUS
Status: DISCONTINUED | OUTPATIENT
Start: 2017-08-18 | End: 2017-08-18 | Stop reason: HOSPADM

## 2017-08-18 RX ORDER — LIDOCAINE HYDROCHLORIDE 10 MG/ML
0.1 INJECTION INFILTRATION; PERINEURAL AS NEEDED
Status: DISCONTINUED | OUTPATIENT
Start: 2017-08-18 | End: 2017-08-18 | Stop reason: HOSPADM

## 2017-08-18 RX ORDER — OXYCODONE HYDROCHLORIDE 5 MG/1
5 TABLET ORAL
Status: DISCONTINUED | OUTPATIENT
Start: 2017-08-18 | End: 2017-08-18

## 2017-08-18 RX ORDER — OXYCODONE AND ACETAMINOPHEN 5; 325 MG/1; MG/1
1 TABLET ORAL AS NEEDED
Status: DISCONTINUED | OUTPATIENT
Start: 2017-08-18 | End: 2017-08-19 | Stop reason: HOSPADM

## 2017-08-18 RX ORDER — SODIUM CHLORIDE 9 MG/ML
50 INJECTION, SOLUTION INTRAVENOUS CONTINUOUS
Status: DISCONTINUED | OUTPATIENT
Start: 2017-08-18 | End: 2017-08-18 | Stop reason: HOSPADM

## 2017-08-18 RX ORDER — ROCURONIUM BROMIDE 10 MG/ML
INJECTION, SOLUTION INTRAVENOUS AS NEEDED
Status: DISCONTINUED | OUTPATIENT
Start: 2017-08-18 | End: 2017-08-18 | Stop reason: HOSPADM

## 2017-08-18 RX ORDER — HYDROMORPHONE HYDROCHLORIDE 2 MG/ML
0.5 INJECTION, SOLUTION INTRAMUSCULAR; INTRAVENOUS; SUBCUTANEOUS
Status: DISCONTINUED | OUTPATIENT
Start: 2017-08-18 | End: 2017-08-19 | Stop reason: HOSPADM

## 2017-08-18 RX ORDER — DIPHENHYDRAMINE HYDROCHLORIDE 50 MG/ML
12.5 INJECTION, SOLUTION INTRAMUSCULAR; INTRAVENOUS ONCE
Status: DISCONTINUED | OUTPATIENT
Start: 2017-08-18 | End: 2017-08-19 | Stop reason: HOSPADM

## 2017-08-18 RX ORDER — SODIUM CHLORIDE 0.9 % (FLUSH) 0.9 %
5-10 SYRINGE (ML) INJECTION EVERY 8 HOURS
Status: DISCONTINUED | OUTPATIENT
Start: 2017-08-18 | End: 2017-08-18 | Stop reason: HOSPADM

## 2017-08-18 RX ORDER — LIDOCAINE HYDROCHLORIDE 20 MG/ML
INJECTION, SOLUTION EPIDURAL; INFILTRATION; INTRACAUDAL; PERINEURAL AS NEEDED
Status: DISCONTINUED | OUTPATIENT
Start: 2017-08-18 | End: 2017-08-18 | Stop reason: HOSPADM

## 2017-08-18 RX ORDER — OXYCODONE HYDROCHLORIDE 5 MG/1
10 TABLET ORAL
Status: DISCONTINUED | OUTPATIENT
Start: 2017-08-18 | End: 2017-08-19 | Stop reason: HOSPADM

## 2017-08-18 RX ORDER — FENTANYL CITRATE 50 UG/ML
25 INJECTION, SOLUTION INTRAMUSCULAR; INTRAVENOUS ONCE
Status: DISCONTINUED | OUTPATIENT
Start: 2017-08-18 | End: 2017-08-18 | Stop reason: HOSPADM

## 2017-08-18 RX ORDER — SODIUM CHLORIDE, SODIUM LACTATE, POTASSIUM CHLORIDE, CALCIUM CHLORIDE 600; 310; 30; 20 MG/100ML; MG/100ML; MG/100ML; MG/100ML
100 INJECTION, SOLUTION INTRAVENOUS CONTINUOUS
Status: DISCONTINUED | OUTPATIENT
Start: 2017-08-18 | End: 2017-08-18 | Stop reason: HOSPADM

## 2017-08-18 RX ORDER — FENTANYL CITRATE 50 UG/ML
INJECTION, SOLUTION INTRAMUSCULAR; INTRAVENOUS AS NEEDED
Status: DISCONTINUED | OUTPATIENT
Start: 2017-08-18 | End: 2017-08-18 | Stop reason: HOSPADM

## 2017-08-18 RX ORDER — PROPOFOL 10 MG/ML
INJECTION, EMULSION INTRAVENOUS AS NEEDED
Status: DISCONTINUED | OUTPATIENT
Start: 2017-08-18 | End: 2017-08-18 | Stop reason: HOSPADM

## 2017-08-18 RX ORDER — BUPIVACAINE HYDROCHLORIDE AND EPINEPHRINE 5; 5 MG/ML; UG/ML
INJECTION, SOLUTION EPIDURAL; INTRACAUDAL; PERINEURAL AS NEEDED
Status: DISCONTINUED | OUTPATIENT
Start: 2017-08-18 | End: 2017-08-18 | Stop reason: HOSPADM

## 2017-08-18 RX ORDER — SODIUM CHLORIDE 0.9 % (FLUSH) 0.9 %
5-10 SYRINGE (ML) INJECTION AS NEEDED
Status: DISCONTINUED | OUTPATIENT
Start: 2017-08-18 | End: 2017-08-19 | Stop reason: HOSPADM

## 2017-08-18 RX ORDER — MIDAZOLAM HYDROCHLORIDE 1 MG/ML
2 INJECTION, SOLUTION INTRAMUSCULAR; INTRAVENOUS ONCE
Status: DISCONTINUED | OUTPATIENT
Start: 2017-08-18 | End: 2017-08-18 | Stop reason: HOSPADM

## 2017-08-18 RX ADMIN — SODIUM CHLORIDE, SODIUM LACTATE, POTASSIUM CHLORIDE, AND CALCIUM CHLORIDE 100 ML/HR: 600; 310; 30; 20 INJECTION, SOLUTION INTRAVENOUS at 15:19

## 2017-08-18 RX ADMIN — ONDANSETRON 4 MG: 2 INJECTION INTRAMUSCULAR; INTRAVENOUS at 18:07

## 2017-08-18 RX ADMIN — LIDOCAINE HYDROCHLORIDE 60 MG: 20 INJECTION, SOLUTION EPIDURAL; INFILTRATION; INTRACAUDAL; PERINEURAL at 17:30

## 2017-08-18 RX ADMIN — SODIUM CHLORIDE, SODIUM LACTATE, POTASSIUM CHLORIDE, AND CALCIUM CHLORIDE: 600; 310; 30; 20 INJECTION, SOLUTION INTRAVENOUS at 18:08

## 2017-08-18 RX ADMIN — FAMOTIDINE 20 MG: 20 TABLET ORAL at 15:21

## 2017-08-18 RX ADMIN — ROCURONIUM BROMIDE 40 MG: 10 INJECTION, SOLUTION INTRAVENOUS at 17:31

## 2017-08-18 RX ADMIN — LIDOCAINE HYDROCHLORIDE 40 MG: 20 INJECTION, SOLUTION EPIDURAL; INFILTRATION; INTRACAUDAL; PERINEURAL at 17:34

## 2017-08-18 RX ADMIN — PROPOFOL 200 MG: 10 INJECTION, EMULSION INTRAVENOUS at 17:30

## 2017-08-18 RX ADMIN — OXYCODONE HYDROCHLORIDE 10 MG: 5 TABLET ORAL at 19:10

## 2017-08-18 RX ADMIN — FENTANYL CITRATE 50 MCG: 50 INJECTION, SOLUTION INTRAMUSCULAR; INTRAVENOUS at 17:52

## 2017-08-18 RX ADMIN — FENTANYL CITRATE 50 MCG: 50 INJECTION, SOLUTION INTRAMUSCULAR; INTRAVENOUS at 17:27

## 2017-08-18 RX ADMIN — ACETAMINOPHEN 1000 MG: 10 INJECTION, SOLUTION INTRAVENOUS at 19:02

## 2017-08-18 RX ADMIN — CEFAZOLIN 2 G: 1 INJECTION, POWDER, FOR SOLUTION INTRAMUSCULAR; INTRAVENOUS; PARENTERAL at 17:34

## 2017-08-18 RX ADMIN — MIDAZOLAM HYDROCHLORIDE 2 MG: 1 INJECTION, SOLUTION INTRAMUSCULAR; INTRAVENOUS at 16:50

## 2017-08-18 RX ADMIN — FENTANYL CITRATE 50 MCG: 50 INJECTION, SOLUTION INTRAMUSCULAR; INTRAVENOUS at 18:23

## 2017-08-18 NOTE — H&P
Allergies: Doxycycline; Tramadol HCl  Medications:ClonazePAM (2 MG);Diclofenac Sodium (50 MG); Fluconazole (150 MG);Gabapentin (600 MG); Levocetirizine Dihydrochloride (5 MG);MetFORMIN HCl (500 MG); Morphine Sulfate ER (30 MG); Omeprazole (20 MG); Oxycodone-Acetaminophen ( MG); Simvastatin (40 MG); TiZANidine HCl (4 MG); Venlafaxine HCl ER (150 MG)    CC: LOW BACK PAIN    HX:  She is a 59-YO female who 2 weeks ago we did a T5, T6 and T11 kyphoplasty for osteoporotic compression fractures. She was having a lot of pain. She has osteoporosis. She is only 62, but she has pulmonary issues I believe bronchitis/ COPD from smoking and  I think she still smokes. She sees the pulmonary doctors. She has been on Prednisone on and off for a long time. I think where her osteoporosis came from. She and her  state after the kyphoplasties she was doing well until recently. She had to go to the bathroom in the middle of the night and tripped over her grandson who was lying on the floor and fell and she has had lower down back pain that is very similar to what she had before. It hurts all the time and limits her ability to get around. X-RAYS:  AP & lateral thoracic and lumbar x-rays 8/9/17 and we compared them to preop x-rays. On the new x-rays she has what looks like a very good solid kyphoplasty at T5, T6 and T11. No real extravasation. I dont see any fractures adjacent to any of these levels. She does have some kyphosis secondary to these fractures mainly in the T5-6 area. We dont really see the thoracic on x-rays below the T11 very well. Lumbar x-rays show very good lumbar lordosis. She has a new L1 compression fracture compared to her preop x-rays. No other fractures. We can see the very bottom of the T11 kyphoplasty that we had previously done. Bones look very osteopenic. IMPRESSION:  1) There are no interval fractures in this area and she has solid kyphoplasties. 2) New L1 compression fracture.  No other interval fractures. Bone looks osteopenic. We did sent her bone for pathology and it was negative for any cancer. EXAM: She is has a somewhat kyphotic appearance in the upper thoracic spine and looks uncomfortable. She is alert and oriented x 3 with somewhat of a flat affect. She is obese. HEENT: PERRLA. Oropharynx is clear. Neck is without adenopathy or bruits. Lungs have some scattered ronchi. Heart is RRR without murmur. Abdomen is obese, soft and nontender. I talked with the patient and her  and showed them x-rays and told them she ahs a new fracture from the fall and she was doing well until she had the fall. So, we talked about either letting this heal on its own or doing another kyphoplasty and went through that again. She is very familiar as she just had a kyphoplasty. She prefers to go ahead and treat this with kyphoplasty. PLAN:  We will line her up for L1 kyphoplasty. In addition, we talked about the fact she his not on any treatment for osteoporosis. We are going to refer her to Dr. Jumana Martinez for osteoporosis eval and treat. She is not on any blood thinners.

## 2017-08-18 NOTE — DISCHARGE INSTRUCTIONS
KYPHOPLASTY DISCHARGE INSTRUCTIONS    General Information: This procedure is done to help with the back pain that is associated with compression fractures in the spine. The kyphoplasty involves placing a balloon into the space of the vertebrae that is fractured, blowing up the balloon, therefore realigning the broken pieces of bone, and then injecting cement into the space to strengthen the vertebrae. The pain experienced from compression fractures is caused by the vertebrae not being stabilized. The cement stabilizes the bone, therefore reducing the pain. Home Care Instructions: You can resume your regular diet and medication regimen. Do not drink alcohol, drive, or make any important legal decisions in the next 24 hours. Do not lift anything heavier than a gallon of milk, or do anything strenuous for the next 24 hours. You will notice a dressing on your lower back after your procedure. This dressing can be removed in 24 hours. Showering is acceptable in 24 hours, but you should refrain from tub baths or swimming for 5 days. Call If:     You should call your Physician if you have any bleeding other than a small spot on your bandage. Call if you have any signs of infection, fever, or increased pain at the site. Call if you should have new or worsening pain in your back, or if you lose control of your bladder or bowel. Any tingling or loss of feeling or movement in your legs should also be reported. Follow-Up Instructions: Please see your ordering doctor as he has requested.      To Reach Us:  [address] [facility] [phone]    After general anesthesia or intravenous sedation, for 24 hours or while taking prescription Narcotics:  · Limit your activities  · Do not drive and operate hazardous machinery  · Do not make important personal or business decisions  · Do  not drink alcoholic beverages  · If you have not urinated within 8 hours after discharge, please contact your surgeon on call.    *  Please give a list of your current medications to your Primary Care Provider. *  Please update this list whenever your medications are discontinued, doses are      changed, or new medications (including over-the-counter products) are added. *  Please carry medication information at all times in case of emergency situations. These are general instructions for a healthy lifestyle:  No smoking/ No tobacco products/ Avoid exposure to second hand smoke  Surgeon General's Warning:  Quitting smoking now greatly reduces serious risk to your health. Obesity, smoking, and sedentary lifestyle greatly increases your risk for illness  A healthy diet, regular physical exercise & weight monitoring are important for maintaining a healthy lifestyle    You may be retaining fluid if you have a history of heart failure or if you experience any of the following symptoms:  Weight gain of 3 pounds or more overnight or 5 pounds in a week, increased swelling in our hands or feet or shortness of breath while lying flat in bed. Please call your doctor as soon as you notice any of these symptoms; do not wait until your next office visit. Recognize signs and symptoms of STROKE:    F-face looks uneven    A-arms unable to move or move unevenly    S-speech slurred or non-existent    T-time-call 911 as soon as signs and symptoms begin-DO NOT go       Back to bed or wait to see if you get better-TIME IS BRAIN.

## 2017-08-18 NOTE — ANESTHESIA PREPROCEDURE EVALUATION
Anesthetic History   No history of anesthetic complications            Review of Systems / Medical History  Patient summary reviewed and pertinent labs reviewed    Pulmonary    COPD: moderate    Sleep apnea: CPAP           Neuro/Psych         Psychiatric history     Cardiovascular                  Exercise tolerance: >4 METS     GI/Hepatic/Renal     GERD: well controlled           Endo/Other    Diabetes (borderline stopped taking her oral meds): well controlled, type 2    Morbid obesity     Other Findings   Comments: Anxiety  Insomnia  Sinusitis  pica           Physical Exam    Airway  Mallampati: II  TM Distance: 4 - 6 cm  Neck ROM: normal range of motion   Mouth opening: Normal     Cardiovascular  Regular rate and rhythm,  S1 and S2 normal,  no murmur, click, rub, or gallop  Rhythm: regular  Rate: normal         Dental    Dentition: Full upper dentures     Pulmonary  Breath sounds clear to auscultation               Abdominal  GI exam deferred       Other Findings            Anesthetic Plan    ASA: 3  Anesthesia type: general          Induction: Intravenous  Anesthetic plan and risks discussed with: Patient

## 2017-08-18 NOTE — BRIEF OP NOTE
BRIEF OPERATIVE NOTE    Date of Procedure: 8/18/2017   Preoperative Diagnosis: Crush fracture of vertebra due to osteoporosis, initial encounter ([facility] [M80.08XA]  Postoperative Diagnosis: Crush fracture of vertebra due to osteoporosis, initial encounter ([facility] [M80.08XA]    Procedure(s):  KYPHOPLASTY L1  Surgeon(s) and Role:     * Nettie Hardy MD - Primary         Assistant Staff:       Surgical Staff:  Circ-1: Francesca Horn RN  Radiology Technician: Sarah Wagner  Scrub Tech-1: Génesis Salcedo  Event Time In   Incision Start 1753   Incision Close 1805     Anesthesia: General   Estimated Blood Loss: minimal  Specimens: * No specimens in log *   Findings: fracture   Complications: none  Implants:   Implant Name Type Inv.  Item Serial No.  Lot No. LRB No. Used Action   PACK MIXER BNE CEMENT KYPHON --  - [id number]   PACK MIXER BNE CEMENT Buster Roers --    [id number] N/A 1 Implanted

## 2017-08-18 NOTE — IP AVS SNAPSHOT
[address] [address] 
[phone] Patient: Angel Nowak MRN: [id number] NOW:[birth year] You are allergic to the following Allergen Reactions Chantix (Varenicline) Other (comments) Severe dreams Darvocet A500 (Propoxyphene N-Acetaminophen) Shortness of Breath Doxycycline Nausea and Vomiting Naprosyn (Naproxen) Nausea Only Prednisolone Shortness of Breath Chest pain Prednisone Shortness of Breath Ultram (Tramadol) Shortness of Breath Recent Documentation Height Weight BMI OB Status Smoking Status 1.702 m 105.3 kg 36.38 kg/m2 Hysterectomy Current Every Day Smoker Emergency Contacts Name Discharge Info Relation Home Work Mobile Benigno Coppola  Spouse [3] [phone] Josh Roche  Child [2] [phone] About your hospitalization You were admitted on:  August 18, 2017 [facility] discharged on:  August 18, 2017 Unit phone number:  [phone] Why you were hospitalized Your primary diagnosis was:  Vertebral Fracture, Osteoporotic (Hcc) Providers Seen During Your Hospitalizations Provider Role Specialty Primary office phone Andrea Wright MD Attending Provider Orthopedic Surgery [phone] Your Primary Care Physician (PCP) Primary Care Physician Office Phone Office Fax Randee Correia [phone] Follow-up Information Follow up With Details Comments Contact Info Penelope Gupta PA-C   [address] 
[phone] Andrea Wright MD  2 weeks [address] [address] [phone] Current Discharge Medication List  
  
CONTINUE these medications which have CHANGED Dose & Instructions Dispensing Information Comments Morning Noon Evening Bedtime  
 omeprazole 20 mg capsule Commonly known as:  PRILOSEC What changed:  See the new instructions. Your last dose was: Your next dose is: TAKE 1 CAPSULE BY MOUTH ONCE DAILY Quantity:  90 Cap Refills:  1  
     
   
   
   
  
 simvastatin 40 mg tablet Commonly known as:  ZOCOR What changed:  See the new instructions. Your last dose was: Your next dose is: TAKE 1 TABLET BY MOUTH EVERY EVENING Quantity:  30 Tab Refills:  3 CONTINUE these medications which have NOT CHANGED Dose & Instructions Dispensing Information Comments Morning Noon Evening Bedtime ADVAIR DISKUS 250-50 mcg/dose diskus inhaler Generic drug:  fluticasone-salmeterol Your last dose was: Your next dose is:    
   
   
 Dose:  1 Puff 1 Puff two (2) times a day. Refills:  0  
     
   
   
   
  
 clonazePAM 2 mg tablet Commonly known as:  Job Nathan Your last dose was: Your next dose is:    
   
   
 Dose:  2 mg Take 2 mg by mouth as needed. Refills:  0  
     
   
   
   
  
 EFFEXOR  mg capsule Generic drug:  venlafaxine-SR Your last dose was: Your next dose is:    
   
   
 Dose:  150 mg Take 150 mg by mouth every morning. Refills:  0  
     
   
   
   
  
 gabapentin 600 mg tablet Commonly known as:  NEURONTIN Your last dose was: Your next dose is:    
   
   
 Dose:  600 mg Take 600 mg by mouth three (3) times daily. Refills:  0  
     
   
   
   
  
 glucose blood VI test strips strip Commonly known as:  Marisabel Schneider Your last dose was: Your next dose is:    
   
   
 Daily sugar checks Quantity:  100 Each Refills:  3 Lancets Misc Commonly known as: One Touch Dot Aviles Your last dose was: Your next dose is:    
   
   
 Daily sugar checks Quantity:  100 Each Refills:  3 LASIX 20 mg tablet Generic drug:  furosemide Your last dose was: Your next dose is:    
   
   
 Dose:  20 mg Take 20 mg by mouth as needed. Refills:  0  
     
   
   
   
  
 levocetirizine 5 mg tablet Commonly known as:  Jennifer Corona Your last dose was: Your next dose is:    
   
   
 Dose:  5 mg Take 5 mg by mouth nightly. Refills:  5  
     
   
   
   
  
 linaclotide 290 mcg Cap capsule Commonly known as:  Antonia Mone Your last dose was: Your next dose is:    
   
   
 Dose:  290 mcg Take 1 Cap by mouth Daily (before breakfast). Quantity:  30 Cap Refills:  5  
     
   
   
   
  
 morphine CR 15 mg CR tablet Commonly known as:  MS CONTIN Your last dose was: Your next dose is:    
   
   
 Dose:  30 mg Take 30 mg by mouth every twelve (12) hours. Refills:  0  
     
   
   
   
  
 NASONEX 50 mcg/actuation nasal spray Generic drug:  mometasone Your last dose was: Your next dose is:    
   
   
 Dose:  2 Spray 2 Sprays by Both Nostrils route nightly. Refills:  0  
     
   
   
   
  
 oxyCODONE-acetaminophen  mg per tablet Commonly known as:  PERCOCET 10 Your last dose was: Your next dose is:    
   
   
 Dose:  1 Tab Take 1 Tab by mouth every four (4) hours as needed. Refills:  0 PROAIR HFA 90 mcg/actuation inhaler Generic drug:  albuterol Your last dose was: Your next dose is: Take  by inhalation every six (6) hours as needed. Refills:  0  
     
   
   
   
  
 tiZANidine 4 mg tablet Commonly known as:  Rana Healy Your last dose was: Your next dose is:    
   
   
 Dose:  4 mg Take 4 mg by mouth three (3) times daily as needed. Refills:  0  
     
   
   
   
  
 zolpidem 10 mg tablet Commonly known as:  AMBIEN Your last dose was: Your next dose is:    
   
   
 Dose:  10 mg  
10 mg nightly as needed. Refills:  0 Discharge Instructions KYPHOPLASTY DISCHARGE INSTRUCTIONS General Information: This procedure is done to help with the back pain that is associated with compression fractures in the spine. The kyphoplasty involves placing a balloon into the space of the vertebrae that is fractured, blowing up the balloon, therefore realigning the broken pieces of bone, and then injecting cement into the space to strengthen the vertebrae. The pain experienced from compression fractures is caused by the vertebrae not being stabilized. The cement stabilizes the bone, therefore reducing the pain. Home Care Instructions: You can resume your regular diet and medication regimen. Do not drink alcohol, drive, or make any important legal decisions in the next 24 hours. Do not lift anything heavier than a gallon of milk, or do anything strenuous for the next 24 hours. You will notice a dressing on your lower back after your procedure. This dressing can be removed in 24 hours. Showering is acceptable in 24 hours, but you should refrain from tub baths or swimming for 5 days. Call If: 
   You should call your Physician if you have any bleeding other than a small spot on your bandage. Call if you have any signs of infection, fever, or increased pain at the site. Call if you should have new or worsening pain in your back, or if you lose control of your bladder or bowel. Any tingling or loss of feeling or movement in your legs should also be reported. Follow-Up Instructions: Please see your ordering doctor as he has requested. To Reach Us:  [employer] [phone] After general anesthesia or intravenous sedation, for 24 hours or while taking prescription Narcotics: · Limit your activities · Do not drive and operate hazardous machinery · Do not make important personal or business decisions · Do  not drink alcoholic beverages · If you have not urinated within 8 hours after discharge, please contact your surgeon on call. *  Please give a list of your current medications to your Primary Care Provider. *  Please update this list whenever your medications are discontinued, doses are 
    changed, or new medications (including over-the-counter products) are added. *  Please carry medication information at all times in case of emergency situations. These are general instructions for a healthy lifestyle: No smoking/ No tobacco products/ Avoid exposure to second hand smoke Surgeon General's Warning:  Quitting smoking now greatly reduces serious risk to your health. Obesity, smoking, and sedentary lifestyle greatly increases your risk for illness A healthy diet, regular physical exercise & weight monitoring are important for maintaining a healthy lifestyle You may be retaining fluid if you have a history of heart failure or if you experience any of the following symptoms:  Weight gain of 3 pounds or more overnight or 5 pounds in a week, increased swelling in our hands or feet or shortness of breath while lying flat in bed. Please call your doctor as soon as you notice any of these symptoms; do not wait until your next office visit. Recognize signs and symptoms of STROKE: 
 
F-face looks uneven A-arms unable to move or move unevenly S-speech slurred or non-existent T-time-call 911 as soon as signs and symptoms begin-DO NOT go Back to bed or wait to see if you get better-TIME IS BRAIN. Discharge Orders None Introducing [facility]! Eula Banks introduces [employer] patient portal. Now you can access parts of your medical record, email your doctor's office, and request medication refills online.    
 
1. In your internet browser, go to [email or web address] 2. Click on the First Time User? Click Here link in the Sign In box. You will see the New Member Sign Up page. 3. Enter your Liveset Access Code exactly as it appears below. You will not need to use this code after youve completed the sign-up process. If you do not sign up before the expiration date, you must request a new code. · Liveset Access Code: [email or web address] Expires: 10/16/2017  3:30 PM 
 
4. Enter the last four digits of your Social Security Number (xxxx) and Date of Birth (mm/dd/yyyy) as indicated and click Submit. You will be taken to the next sign-up page. 5. Create a Liveset ID. This will be your Liveset login ID and cannot be changed, so think of one that is secure and easy to remember. 6. Create a Liveset password. You can change your password at any time. 7. Enter your Password Reset Question and Answer. This can be used at a later time if you forget your password. 8. Enter your e-mail address. You will receive e-mail notification when new information is available in [address] 9. Click Sign Up. You can now view and download portions of your medical record. 10. Click the Download Summary menu link to download a portable copy of your medical information. If you have questions, please visit the Frequently Asked Questions section of the Liveset website. Remember, Liveset is NOT to be used for urgent needs. For medical emergencies, dial 911. Now available from your iPhone and Android! General Information Please provide this summary of care documentation to your next provider. Patient Signature:  ____________________________________________________________ Date:  ____________________________________________________________  
  
Bryan Endo Provider Signature:  ____________________________________________________________ Date:  ____________________________________________________________

## 2017-08-19 NOTE — ANESTHESIA POSTPROCEDURE EVALUATION
Post-Anesthesia Evaluation and Assessment    Patient: Tab Gupta MRN: [de-identified]  SSN: [id number]    YOB: 1960  Age: 62 y.o. Sex: female       Cardiovascular Function/Vital Signs  Visit Vitals    /75 (BP 1 Location: Left arm, BP Patient Position: At rest;Supine)    Pulse 86    Temp 36.6 °C (97.8 °F)    Resp 16    Ht 5' 7\" (1.702 m)    Wt 105.3 kg (232 lb 4 oz)    SpO2 92%    BMI 36.38 kg/m2       Patient is status post general anesthesia for Procedure(s):  KYPHOPLASTY L1.    Nausea/Vomiting: None    Postoperative hydration reviewed and adequate. Pain:  Pain Scale 1: Numeric (0 - 10) (08/18/17 1934)  Pain Intensity 1: 5 (08/18/17 1934)   Managed    Neurological Status:   Neuro (WDL): Within Defined Limits (08/18/17 1934)  Neuro  Neurologic State: Drowsy (08/18/17 1829)   At baseline    Mental Status and Level of Consciousness: Arousable    Pulmonary Status:   O2 Device: Room air (08/18/17 1934)   Adequate oxygenation and airway patent    Complications related to anesthesia: None    Post-anesthesia assessment completed.  No concerns    Signed By: Ajay James MD     August 18, 2017

## 2017-08-19 NOTE — OP NOTES
[facility]   OPERATIVE REPORT       Name:  Malik Rodriguez   MR#:  [de-identified]   :  1960   Account #:  [de-identified]   Date of Adm:  2017       DATE OF SURGERY: 2017     SURGEON: Kirstin Mcfadden MD    PREOPERATIVE DIAGNOSIS: Osteoporotic L1 compression fracture. POSTOPERATIVE DIAGNOSIS: Osteoporotic L1 compression fracture. PROCEDURE: Bilateral L1 kyphoplasty with attempted bone biopsy   and procedure was carried out using biplanar C-arm fluoroscopy   imaging. ANESTHESIA: GETA. ESTIMATED BLOOD LOSS: Minimal.    FLUIDS: 500 mL crystalloid. SPECIMENS: None. INDICATIONS: The patient is a 60-year-old female with   osteoporosis from steroid use. She has had previous fractures   treated with kyphoplasty, has a new one with intractable back   pain, failure to get better with medications, activity   restriction, so she is brought for surgical treatment. PROCEDURE: The patient was brought to the operating room. After   administration of anesthesia, IV antibiotics and placement of   monitoring lines, she was positioned prone on the Ver Nailer frame. Her back was prepped with Betadine and sterilely draped. At this   point, surgeon called a timeout, confirmed the procedure to be   performed, her allergy history and the fact that she had   received preoperative IV antibiotics. AP and lateral C-arms were   brought. We identified the L1 vertebral body which showed   compression of the superior end plate and lined up the pedicles   on the AP and lateral views. We marked the lateral aspect of the   pedicle on the skin bilaterally and then made a stab wound about   a fingerbreadth lateral to this bilaterally.  We inserted our   trocars down to the lateral edge of the pedicle and checking   under AP and lateral C-arm views, we inserted the trocar down to   the pedicle, being careful not to breach the medial cortex,   anchored it in the posterior aspect of the vertebral body. We   attempted a bone biopsy from both sides. No material was   obtained. We drilled towards the anterior cortex and inserted   our inflatable tamps. We inflated the tamps, got good filling of   the vertebral body. During this time, the methylmethacrylate was   reconstituted and placed into the insertion devices. The   inflatable tamps were deflated and removed. The insertion   devices were inserted and we compressed the methylmethacrylate   out into the void created by the bone tamps. Once we had gotten   good filling, we stopped. There was no extravasation seen. We   let the cement harden. We then removed the bone fillers and   checked for tails and none were seen, so the trocar sleeves were   removed. We anesthetized the skin and subcutaneous tissue with   30 mL of 0.5% Marcaine with epinephrine, closed each skin   incision with a single stitch of 3-0 nylon. AP and lateral C-arm   final x-rays were obtained and then placed a dry sterile   dressing over each incision. The patient was then rolled supine   onto the recovery room bed, having tolerated the procedure well.         MD MIL Cohn / Gali Marinelli   D:  08/18/2017   18:25   T:  08/19/2017   14:20   Job #:  [id number]

## 2017-09-13 ENCOUNTER — HOSPITAL ENCOUNTER (OUTPATIENT)
Dept: SURGERY | Age: 57
Discharge: HOME | End: 2017-09-13

## 2017-09-13 VITALS
HEART RATE: 88 BPM | OXYGEN SATURATION: 90 % | RESPIRATION RATE: 17 BRPM | WEIGHT: 222.5 LBS | BODY MASS INDEX: 42.01 KG/M2 | SYSTOLIC BLOOD PRESSURE: 140 MMHG | TEMPERATURE: 97.9 F | DIASTOLIC BLOOD PRESSURE: 78 MMHG | HEIGHT: 61 IN

## 2017-09-13 RX ORDER — DIFLUNISAL 500 MG/1
500 TABLET, FILM COATED ORAL 2 TIMES DAILY
COMMUNITY

## 2017-09-13 RX ORDER — MORPHINE SULFATE 30 MG/1
30 CAPSULE, EXTENDED RELEASE ORAL
COMMUNITY

## 2017-09-13 RX ORDER — ASPIRIN 325 MG
1 TABLET, DELAYED RELEASE (ENTERIC COATED) ORAL
COMMUNITY
End: 2017-09-13 | Stop reason: CLARIF

## 2017-09-13 RX ORDER — ERGOCALCIFEROL 1.25 MG/1
50000 CAPSULE ORAL
COMMUNITY

## 2017-09-13 RX ORDER — OMEPRAZOLE 20 MG/1
20 CAPSULE, DELAYED RELEASE ORAL
COMMUNITY

## 2017-09-13 NOTE — PERIOP NOTES
Patient verified name, , and surgery as listed in [facility]. TYPE  CASE:1B            Orders: received / Derian Lucio per surgeon:  None   Labs per anesthesia protocol: None  EKG  :  Not required  Glucose: Not required    Requested pt to validate each medication, dose and frequency while here today. Copy of medication reconciliation provided to the patient and instructed patient to compare to medication bottles. If any changes need to be made call this RN at [phone]. Pt to call back with updates as she forgot to bring medications or list, however pt did produce Percocet and Morphine Rx bottles which were visualized while here today. Instructed patient to continue previous medications as prescribed prior to surgery and  to take the following medications the day of surgery according to anesthesia guidelines with a small sip of water : klonopin, gabapentin, Morphine, omeprazole, Venlafaxine and Percocet as needed. Pt verbalizes understanding to use Advair and Incruise Ellipta inhalers and to bring rescue inhaler and CPAP       Continue all previous medications unless otherwise directed. Instructed patient to hold  the following medications prior to surgery: None    Patient Guide to Surgery Packet provided to patient. Packet includes Patient Guide to surgery handout, Facts about Pain Management handout, Facts about Urinary Catherization handout, Hand Hygiene handout, Patient Education and Teaching Sheet -Transfusion of Blood and Blood Products handout, and  [facility] frequent question and answer sheet. Guide reviewed with the patient and all questions answered to the satisfaction of the patient. Pt advised to visit [email or web address] for more educational information regarding anesthesia and to record any additional questions that arise so that it can be addressed by the anesthesiologist on the morning of surgery.     Patient instructed on the following and verbalized understanding:  Arrive at MAIN entrance, time of arrival to be called the day before by 1700. Responsible adult must drive patient to and from hospital, stay during surgery and 24 hours postoperatively. Npo after midnight including gum, mints and ice chips. Shower using non-moisturizing soap and hibiclens the night before and the morning of surgery, both of which were provided here today with verbal instructions and handout. Leave all valuables at home. Instructed on no jewelry or body piercings on the dos. Bring insurance card and ID. No perfumes, oil, powder, colognes, makeup or  lotions on the skin. Patient verbalized understanding of all instructions and provided all medical/health information to the best of their ability.

## 2017-09-15 NOTE — H&P
Allergies: Doxycycline;TraMADol HCl  Medications:ClonazePAM (2 MG);Diclofenac Sodium (50 MG); Fluconazole (150 MG);Gabapentin (600 MG); Levocetirizine Dihydrochloride (5 MG);MetFORMIN HCl (500 MG); Morphine Sulfate ER (30 MG); Omeprazole (20 MG); Oxycodone-Acetaminophen ( MG); Simvastatin (40 MG); TiZANidine HCl (4 MG); Venlafaxine HCl ER (150 MG)    CC: BACK PAIN    HX:  She is 2-3 weeks out from an L1 kyphoplasty. We attempted bone biopsy, but could not get any material.    Today she says she is hurting and moving slowly and she looks very kyphotic. There is no leg pain, numbness, tingling weakness. X-RAYS:  AP & lateral lumbar x-rays and AP & lateral thoracic x-rays 9/7/17 and on the lumbar x-rays we can see the L1 kyphoplasty that was recently done and she had an old T11 kyphoplasty. Since we did the surgery 2-3 weeks ago she has developed a new compression fracture at T12 as well as L4 and basically with having had 3 compression fractures at T11, 12 and L1 causes a pretty sharp acute gibbus deformity. Her spine is tilted way back so I think that is a compensation for the kyphosis. I dont see any other fractures. In the thoracic spine she is straight. She has the previous upper level thoracic kyphoplasties and a gibbus deformity there, but down below there are no new thoracic problems seen. 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 She has the previous kyphoplasties with new fractures at T12 and L4. I talked about treating this and she would like to do a kyphoplasty. She does not want to put up with the pain. She has started on Forteo. She has only been on it for a week and usually takes about 6 weeks before we start seeing clinical benefit.  So, hopefully, we will do these kyphoplasties and she wont have any more fractures before she is able to get enough of the medication to strengthen her bones. We went through the procedure and she understands. EXAM:  She is an obese female who looks uncomfortable. She is alert and oriented x 3. HEENT: PERRLA. Oropharynx is clear. Neck is without adenopathy or bruits. She has a little ronchi in her lungs. Heart is RRR without murmur. Abdomen is obese, soft and nontender, but when I press on it does cause some back pain. PLAN:  We will plan to do a  T12 and L4 kyphoplasty.       Electronically Signed By Nas JAEGER/babatunde

## 2017-09-17 ENCOUNTER — ANESTHESIA EVENT (OUTPATIENT)
Dept: SURGERY | Age: 57
End: 2017-09-17
Payer: MEDICARE

## 2017-09-18 ENCOUNTER — APPOINTMENT (OUTPATIENT)
Dept: GENERAL RADIOLOGY | Age: 57
End: 2017-09-18
Attending: ORTHOPAEDIC SURGERY
Payer: MEDICARE

## 2017-09-18 ENCOUNTER — HOSPITAL ENCOUNTER (OUTPATIENT)
Age: 57
Setting detail: OUTPATIENT SURGERY
Discharge: HOME | End: 2017-09-18
Attending: ORTHOPAEDIC SURGERY | Admitting: ORTHOPAEDIC SURGERY
Payer: MEDICARE

## 2017-09-18 ENCOUNTER — ANESTHESIA (OUTPATIENT)
Dept: SURGERY | Age: 57
End: 2017-09-18
Payer: MEDICARE

## 2017-09-18 VITALS
HEART RATE: 84 BPM | HEIGHT: 61 IN | SYSTOLIC BLOOD PRESSURE: 168 MMHG | OXYGEN SATURATION: 90 % | BODY MASS INDEX: 42.14 KG/M2 | TEMPERATURE: 97.4 F | RESPIRATION RATE: 15 BRPM | WEIGHT: 223.19 LBS | DIASTOLIC BLOOD PRESSURE: 92 MMHG

## 2017-09-18 LAB
GLUCOSE BLD STRIP.AUTO-MCNC: 84 MG/DL (ref 65–100)
GLUCOSE BLD STRIP.AUTO-MCNC: 86 MG/DL (ref 65–100)

## 2017-09-18 PROCEDURE — 74011000250 HC RX REV CODE- 250: Performed by: ORTHOPAEDIC SURGERY

## 2017-09-18 PROCEDURE — 74011636320 HC RX REV CODE- 636/320: Performed by: ORTHOPAEDIC SURGERY

## 2017-09-18 PROCEDURE — 72100 X-RAY EXAM L-S SPINE 2/3 VWS: CPT

## 2017-09-18 PROCEDURE — C1713 ANCHOR/SCREW BN/BN,TIS/BN: HCPCS | Performed by: ORTHOPAEDIC SURGERY

## 2017-09-18 PROCEDURE — 77030020782 HC GWN BAIR PAWS FLX 3M -B: Performed by: ANESTHESIOLOGY

## 2017-09-18 PROCEDURE — 77030012894: Performed by: ORTHOPAEDIC SURGERY

## 2017-09-18 PROCEDURE — 77030011218 HC DEV BIOP BN KYPH -C: Performed by: ORTHOPAEDIC SURGERY

## 2017-09-18 PROCEDURE — 74011250636 HC RX REV CODE- 250/636: Performed by: ORTHOPAEDIC SURGERY

## 2017-09-18 PROCEDURE — 77030008477 HC STYL SATN SLP COVD -A: Performed by: ANESTHESIOLOGY

## 2017-09-18 PROCEDURE — 74011250636 HC RX REV CODE- 250/636

## 2017-09-18 PROCEDURE — 74011250637 HC RX REV CODE- 250/637: Performed by: ANESTHESIOLOGY

## 2017-09-18 PROCEDURE — 72070 X-RAY EXAM THORAC SPINE 2VWS: CPT

## 2017-09-18 PROCEDURE — 77030028759 HC KT SPN BN TAMP FF KYPH -I2: Performed by: ORTHOPAEDIC SURGERY

## 2017-09-18 PROCEDURE — 77030018836 HC SOL IRR NACL ICUM -A: Performed by: ORTHOPAEDIC SURGERY

## 2017-09-18 PROCEDURE — 77030037318 HC KT SPN BN TAMP AF KYPH -H: Performed by: ORTHOPAEDIC SURGERY

## 2017-09-18 PROCEDURE — 76210000016 HC OR PH I REC 1 TO 1.5 HR: Performed by: ORTHOPAEDIC SURGERY

## 2017-09-18 PROCEDURE — 77030008703 HC TU ET UNCUF COVD -A: Performed by: ANESTHESIOLOGY

## 2017-09-18 PROCEDURE — 76010000153 HC OR TIME 1.5 TO 2 HR: Performed by: ORTHOPAEDIC SURGERY

## 2017-09-18 PROCEDURE — 82962 GLUCOSE BLOOD TEST: CPT

## 2017-09-18 PROCEDURE — 76060000034 HC ANESTHESIA 1.5 TO 2 HR: Performed by: ORTHOPAEDIC SURGERY

## 2017-09-18 PROCEDURE — 76210000020 HC REC RM PH II FIRST 0.5 HR: Performed by: ORTHOPAEDIC SURGERY

## 2017-09-18 PROCEDURE — 74011250636 HC RX REV CODE- 250/636: Performed by: ANESTHESIOLOGY

## 2017-09-18 PROCEDURE — 74011000250 HC RX REV CODE- 250

## 2017-09-18 DEVICE — BONE CEMENT CX01B KYPHON XPEDE W MXR US
Type: IMPLANTABLE DEVICE | Site: BACK | Status: FUNCTIONAL
Brand: KYPHON® XPEDE™ BONE CEMENT AND KYPHON® MIXER PACK

## 2017-09-18 RX ORDER — OXYCODONE HYDROCHLORIDE 5 MG/1
5 TABLET ORAL
Status: DISCONTINUED | OUTPATIENT
Start: 2017-09-18 | End: 2017-09-18 | Stop reason: HOSPADM

## 2017-09-18 RX ORDER — ONDANSETRON 2 MG/ML
INJECTION INTRAMUSCULAR; INTRAVENOUS AS NEEDED
Status: DISCONTINUED | OUTPATIENT
Start: 2017-09-18 | End: 2017-09-18 | Stop reason: HOSPADM

## 2017-09-18 RX ORDER — ACETAMINOPHEN 10 MG/ML
INJECTION, SOLUTION INTRAVENOUS AS NEEDED
Status: DISCONTINUED | OUTPATIENT
Start: 2017-09-18 | End: 2017-09-18 | Stop reason: HOSPADM

## 2017-09-18 RX ORDER — PROPOFOL 10 MG/ML
INJECTION, EMULSION INTRAVENOUS AS NEEDED
Status: DISCONTINUED | OUTPATIENT
Start: 2017-09-18 | End: 2017-09-18 | Stop reason: HOSPADM

## 2017-09-18 RX ORDER — LIDOCAINE HYDROCHLORIDE 10 MG/ML
0.1 INJECTION INFILTRATION; PERINEURAL AS NEEDED
Status: DISCONTINUED | OUTPATIENT
Start: 2017-09-18 | End: 2017-09-18 | Stop reason: HOSPADM

## 2017-09-18 RX ORDER — ROCURONIUM BROMIDE 10 MG/ML
INJECTION, SOLUTION INTRAVENOUS AS NEEDED
Status: DISCONTINUED | OUTPATIENT
Start: 2017-09-18 | End: 2017-09-18 | Stop reason: HOSPADM

## 2017-09-18 RX ORDER — GLYCOPYRROLATE 0.2 MG/ML
INJECTION INTRAMUSCULAR; INTRAVENOUS AS NEEDED
Status: DISCONTINUED | OUTPATIENT
Start: 2017-09-18 | End: 2017-09-18 | Stop reason: HOSPADM

## 2017-09-18 RX ORDER — BUPIVACAINE HYDROCHLORIDE AND EPINEPHRINE 5; 5 MG/ML; UG/ML
INJECTION, SOLUTION EPIDURAL; INTRACAUDAL; PERINEURAL AS NEEDED
Status: DISCONTINUED | OUTPATIENT
Start: 2017-09-18 | End: 2017-09-18 | Stop reason: HOSPADM

## 2017-09-18 RX ORDER — FAMOTIDINE 20 MG/1
20 TABLET, FILM COATED ORAL ONCE
Status: COMPLETED | OUTPATIENT
Start: 2017-09-18 | End: 2017-09-18

## 2017-09-18 RX ORDER — MIDAZOLAM HYDROCHLORIDE 1 MG/ML
5 INJECTION, SOLUTION INTRAMUSCULAR; INTRAVENOUS ONCE
Status: DISCONTINUED | OUTPATIENT
Start: 2017-09-18 | End: 2017-09-18 | Stop reason: HOSPADM

## 2017-09-18 RX ORDER — HYDROCODONE BITARTRATE AND ACETAMINOPHEN 5; 325 MG/1; MG/1
2 TABLET ORAL AS NEEDED
Status: DISCONTINUED | OUTPATIENT
Start: 2017-09-18 | End: 2017-09-18 | Stop reason: HOSPADM

## 2017-09-18 RX ORDER — CEFAZOLIN SODIUM IN 0.9 % NACL 2 G/50 ML
2 INTRAVENOUS SOLUTION, PIGGYBACK (ML) INTRAVENOUS ONCE
Status: COMPLETED | OUTPATIENT
Start: 2017-09-18 | End: 2017-09-18

## 2017-09-18 RX ORDER — SODIUM CHLORIDE, SODIUM LACTATE, POTASSIUM CHLORIDE, CALCIUM CHLORIDE 600; 310; 30; 20 MG/100ML; MG/100ML; MG/100ML; MG/100ML
75 INJECTION, SOLUTION INTRAVENOUS CONTINUOUS
Status: DISCONTINUED | OUTPATIENT
Start: 2017-09-18 | End: 2017-09-18 | Stop reason: HOSPADM

## 2017-09-18 RX ORDER — LIDOCAINE HYDROCHLORIDE 20 MG/ML
INJECTION, SOLUTION EPIDURAL; INFILTRATION; INTRACAUDAL; PERINEURAL AS NEEDED
Status: DISCONTINUED | OUTPATIENT
Start: 2017-09-18 | End: 2017-09-18 | Stop reason: HOSPADM

## 2017-09-18 RX ORDER — MIDAZOLAM HYDROCHLORIDE 1 MG/ML
2 INJECTION, SOLUTION INTRAMUSCULAR; INTRAVENOUS
Status: DISCONTINUED | OUTPATIENT
Start: 2017-09-18 | End: 2017-09-18 | Stop reason: HOSPADM

## 2017-09-18 RX ORDER — NEOSTIGMINE METHYLSULFATE 1 MG/ML
INJECTION INTRAVENOUS AS NEEDED
Status: DISCONTINUED | OUTPATIENT
Start: 2017-09-18 | End: 2017-09-18 | Stop reason: HOSPADM

## 2017-09-18 RX ORDER — FENTANYL CITRATE 50 UG/ML
INJECTION, SOLUTION INTRAMUSCULAR; INTRAVENOUS AS NEEDED
Status: DISCONTINUED | OUTPATIENT
Start: 2017-09-18 | End: 2017-09-18 | Stop reason: HOSPADM

## 2017-09-18 RX ORDER — HYDROMORPHONE HYDROCHLORIDE 2 MG/ML
0.5 INJECTION, SOLUTION INTRAMUSCULAR; INTRAVENOUS; SUBCUTANEOUS
Status: DISCONTINUED | OUTPATIENT
Start: 2017-09-18 | End: 2017-09-18 | Stop reason: HOSPADM

## 2017-09-18 RX ORDER — FENTANYL CITRATE 50 UG/ML
100 INJECTION, SOLUTION INTRAMUSCULAR; INTRAVENOUS ONCE
Status: DISCONTINUED | OUTPATIENT
Start: 2017-09-18 | End: 2017-09-18 | Stop reason: HOSPADM

## 2017-09-18 RX ADMIN — SODIUM CHLORIDE, SODIUM LACTATE, POTASSIUM CHLORIDE, AND CALCIUM CHLORIDE 75 ML/HR: 600; 310; 30; 20 INJECTION, SOLUTION INTRAVENOUS at 12:50

## 2017-09-18 RX ADMIN — LIDOCAINE HYDROCHLORIDE 60 MG: 20 INJECTION, SOLUTION EPIDURAL; INFILTRATION; INTRACAUDAL; PERINEURAL at 14:49

## 2017-09-18 RX ADMIN — GLYCOPYRROLATE 0.4 MG: 0.2 INJECTION INTRAMUSCULAR; INTRAVENOUS at 15:47

## 2017-09-18 RX ADMIN — FENTANYL CITRATE 25 MCG: 50 INJECTION, SOLUTION INTRAMUSCULAR; INTRAVENOUS at 15:21

## 2017-09-18 RX ADMIN — FAMOTIDINE 20 MG: 20 TABLET ORAL at 12:54

## 2017-09-18 RX ADMIN — CEFAZOLIN 2 G: 1 INJECTION, POWDER, FOR SOLUTION INTRAMUSCULAR; INTRAVENOUS; PARENTERAL at 15:09

## 2017-09-18 RX ADMIN — NEOSTIGMINE METHYLSULFATE 3 MG: 1 INJECTION INTRAVENOUS at 15:47

## 2017-09-18 RX ADMIN — ACETAMINOPHEN 1000 MG: 10 INJECTION, SOLUTION INTRAVENOUS at 15:30

## 2017-09-18 RX ADMIN — ROCURONIUM BROMIDE 40 MG: 10 INJECTION, SOLUTION INTRAVENOUS at 14:49

## 2017-09-18 RX ADMIN — ONDANSETRON 4 MG: 2 INJECTION INTRAMUSCULAR; INTRAVENOUS at 15:19

## 2017-09-18 RX ADMIN — PROPOFOL 200 MG: 10 INJECTION, EMULSION INTRAVENOUS at 14:49

## 2017-09-18 NOTE — PROGRESS NOTES
Pre op visit and prayer. Patient is calm.  knows family.     Uriah Zapata, staff Ruben raymond [address] [place] [address]  C: [phone]   /   [email or web address]

## 2017-09-18 NOTE — OP NOTES
[facility] 65   OPERATIVE REPORT       Name:  Ankita Metcalf   MR#:  [de-identified]   :  1960   Account #:  [de-identified]   Date of Adm:  2017       DATE OF PROCEDURE: 2017    PREOPERATIVE DIAGNOSIS: Osteoporotic T12 and L4 compression   fractures. POSTOPERATIVE DIAGNOSIS: Osteoporotic T12 and L4 compression   fractures. PROCEDURE: Bilateral T12 and L4 kyphoplasty and procedure was   carried out using biplanar C-arm fluoroscopy imaging. SURGEON: Miguel Balderas. Gato Mims MD    ANESTHESIA: GETA. ESTIMATED BLOOD LOSS: Minimal.    FLUIDS: A liter of crystalloid. SPECIMENS: None. INDICATIONS: The patient is a 60-year-old female with premature   osteoporosis secondary to smoking and steroid use. She had   recently undergone a T11 and L1 kyphoplasty with initially   excellent results, but by the time she had returned for   followup, she was having significant limiting back pain once   again, and just plain x-rays showed new fractures at T12 and L4   compared to the preoperative x-rays. It was felt that these were   new fractures and causing the pain. She elected to proceed on   with a kyphoplasties at these new levels. PROCEDURE: The patient was brought to the operating room. After   administration of anesthesia, IV antibiotics and placement of   monitoring lines, she was positioned prone on the Davina Martinton frame   in the extended position. Her back was prepped with Betadine and   sterilely draped. At this point, surgeon called a timeout,   confirmed the procedure to be performed, her allergy history and   the fact that she had received her preoperative IV antibiotics. AP and lateral C-arms were brought in. We scanned from T11 down   to the sacrum and she had a solid kyphoplasty at T11 and L1. She   had significant wedge fracture at T12 which was improved with   hyperextension on the operative table.  She also had the L4   fracture, but we did not see fractures at any other vertebral   bodies. So we marked the lateral aspect of the pedicles at T12   and L4 and starting at T12 and then going to L4, we made a stab   wound about a fingerbreadth lateral to the foster and checking   under AP and lateral C-arm views, we inserted the trocars down   to the lateral edge of the pedicle and then inserted them down   through the pedicle, being careful not to breach the medial   cortex and anchored them into the posterior aspect of the   vertebral body. We drilled towards the anterior cortex   bilaterally at each level, inserted our inflatable tamps,   inflated them and got reasonably good filling, especially at L4   we were able to level out the compressed endplate. During this   time, the methylmethacrylate was reconstituted and placed into   the insertion devices. The inflatable tamps were then deflated   and removed and we inserted the insertion devices through the   trocar sleeves and compressed the methylmethacrylate out into   the void created by the bone tamps. We got very good filling at   both levels, no extravasation seen. We let the cement harden and   once it had, we removed the bone fillers and checked for tails   and none were seen, so the trocar sleeves were removed. A final   AP and lateral C-arm x-ray were obtained. We anesthetized the   skin, subcutaneous tissue and muscle area at each level with 10   mL of 0.5% Marcaine with epinephrine, a total of 40 mL and then   we closed each skin incision with a single stitch of 3-0 nylon. Dry sterile dressings were applied. The patient was then rolled   supine onto the recovery room bed, having tolerated the   procedure well.         MD MIL Bourne / Jean Dockery   D:  09/18/2017   16:04   T:  09/18/2017   16:42   Job #:  [id number]

## 2017-09-18 NOTE — BRIEF OP NOTE
BRIEF OPERATIVE NOTE    Date of Procedure: 9/18/2017   Preoperative Diagnosis: Age-related osteoporosis with current pathological fracture of vertebra, [facility] encounter ([facility] [M80.08XA]  Postoperative Diagnosis: Age-related osteoporosis with current pathological fracture of vertebra, initial encounter ([facility] [M80.08XA]    Procedure(s):  KYPHOPLASTY T12 & L4  Surgeon(s) and Role:     * Brianne Miranda MD - Primary         Assistant Staff:       Surgical Staff:  Circ-1: Erin Back RN  Radiology Technician: RT Marj  Scrub Tech-1: Kayleigh Howard  Event Time In   Incision Start 1518   Incision Close 1552     Anesthesia: General   Estimated Blood Loss: minimal  Specimens: * No specimens in log *   Findings: fractures   Complications: none  Implants:   Implant Name Type Inv.  Item Serial No.  Lot No. LRB No. Used Action   PACK MIXER BNE CEMENT Karalee Matthew --  - [id number]  PACK MIXER BNE CEMENT Karalee Matthew --   Karalee Matthew [id number] N/A 1 Implanted   PACK MIXER  [address] --  - [id number]   PACK MIXER BNE CEMENT Karalee Matthew --    [id number] N/A 1 Implanted

## 2017-09-18 NOTE — ANESTHESIA PREPROCEDURE EVALUATION
Anesthetic History   No history of anesthetic complications            Review of Systems / Medical History  Patient summary reviewed and pertinent labs reviewed    Pulmonary    COPD: moderate    Sleep apnea: CPAP           Neuro/Psych         Psychiatric history     Cardiovascular                  Exercise tolerance: >4 METS     GI/Hepatic/Renal     GERD: well controlled           Endo/Other    Diabetes (borderline stopped taking her oral meds): well controlled, type 2    Morbid obesity     Other Findings   Comments: Anxiety  Insomnia  Sinusitis  pica         Physical Exam    Airway  Mallampati: II  TM Distance: 4 - 6 cm  Neck ROM: normal range of motion   Mouth opening: Normal     Cardiovascular  Regular rate and rhythm,  S1 and S2 normal,  no murmur, click, rub, or gallop  Rhythm: regular  Rate: normal         Dental    Dentition: Full upper dentures     Pulmonary  Breath sounds clear to auscultation               Abdominal  GI exam deferred       Other Findings            Anesthetic Plan    ASA: 3  Anesthesia type: general          Induction: Intravenous  Anesthetic plan and risks discussed with: Patient and Spouse

## 2017-09-18 NOTE — IP AVS SNAPSHOT
Vance Adams 
 
 
 [facility] [facility] [address] 
[phone] Patient: Vannessa Martinez MRN: [id number] FMM:[birth year] You are allergic to the following Allergen Reactions Chantix (Varenicline) Other (comments) Severe dreams Darvocet A500 (Propoxyphene N-Acetaminophen) Shortness of Breath Doxycycline Nausea and Vomiting Naprosyn (Naproxen) Nausea Only Prednisolone Shortness of Breath Chest pain Prednisone Shortness of Breath Ultram (Tramadol) Shortness of Breath Recent Documentation Height Weight BMI OB Status Smoking Status 1.543 m 101.2 kg 42.52 kg/m2 Hysterectomy Current Every Day Smoker Emergency Contacts Name Discharge Info Relation Home Work Mobile Benigno Coppola  Spouse [3] [phone] Junito Clark  Child [2] [phone] About your hospitalization You were admitted on:  September 18, 2017 [facility] You were discharged on:  September 18, 2017 Unit phone number:  [phone] Why you were hospitalized Your primary diagnosis was:  Vertebral Fracture, Osteoporotic (Hcc) Providers Seen During Your Hospitalizations Provider Role Specialty Primary office phone Damion Renae MD Attending Provider Orthopedic Surgery [phone] Your Primary Care Physician (PCP) Primary Care Physician Office Phone Office Fax Aguilarwarner Franco [phone] Follow-up Information Follow up With Details Comments Contact Info Nadine Molina PA-C   [address] 
[phone] Damion Renae MD  keep previously scheduled appointment [place] [address] 
[phone] Current Discharge Medication List  
  
 CONTINUE these medications which have CHANGED Dose & Instructions Dispensing Information Comments Morning Noon Evening Bedtime  
 simvastatin 40 mg tablet Commonly known as:  ZOCOR What changed:  See the new instructions. Your last dose was: Your next dose is: TAKE 1 TABLET BY MOUTH EVERY EVENING Quantity:  30 Tab Refills:  3 CONTINUE these medications which have NOT CHANGED Dose & Instructions Dispensing Information Comments Morning Noon Evening Bedtime ADVAIR DISKUS 250-50 mcg/dose diskus inhaler Generic drug:  fluticasone-salmeterol Your last dose was: Your next dose is:    
   
   
 Dose:  1 Puff Take 1 Puff by inhalation two (2) times a day. Refills:  0  
     
   
   
   
  
 clonazePAM 2 mg tablet Commonly known as:  Bharathi Kyree Your last dose was: Your next dose is:    
   
   
 Dose:  2 mg Take 2 mg by mouth daily as needed. Refills:  0  
     
   
   
   
  
 diflunisal 500 mg Tab Commonly known as:  DOLOBID Your last dose was: Your next dose is:    
   
   
 Dose:  500 mg Take 500 mg by mouth two (2) times a day. Refills:  0  
     
   
   
   
  
 EFFEXOR  mg capsule Generic drug:  venlafaxine-SR Your last dose was: Your next dose is:    
   
   
 Dose:  150 mg Take 150 mg by mouth every morning. Refills:  0  
     
   
   
   
  
 ergocalciferol 50,000 unit capsule Commonly known as:  ERGOCALCIFEROL Your last dose was: Your next dose is:    
   
   
 Dose:  [address] Units Take 50,000 Units by mouth two (2) days a week. Refills:  0 FORTEO 20 mcg/dose - 600 mcg/2.4 mL Pnij injection Generic drug:  teriparatide Your last dose was: Your next dose is:    
   
   
 Dose:  20 mcg 20 mcg by SubCUTAneous route daily. Refills:  0  
     
   
   
   
  
 gabapentin 600 mg tablet Commonly known as:  NEURONTIN Your last dose was: Your next dose is:    
   
   
 Dose:  600 mg Take 600 mg by mouth three (3) times daily. Refills:  0  
     
   
   
   
  
 glucose blood VI test strips strip Commonly known as:  Aviva Fried Your last dose was: Your next dose is:    
   
   
 Daily sugar checks Quantity:  100 Each Refills:  3 INCRUSE ELLIPTA IN Your last dose was: Your next dose is:    
   
   
 Dose:  1 Puff Take 1 Puff by inhalation every morning. Refills:  0 Lancets Misc Commonly known as: One Touch Leocadia Zoe Your last dose was: Your next dose is:    
   
   
 Daily sugar checks Quantity:  100 Each Refills:  3  
     
   
   
   
  
 levocetirizine 5 mg tablet Commonly known as:  Harish Kitten Your last dose was: Your next dose is:    
   
   
 Dose:  5 mg Take 5 mg by mouth nightly. Refills:  5  
     
   
   
   
  
 linaclotide 290 mcg Cap capsule Commonly known as:  Niko Salehle Your last dose was: Your next dose is:    
   
   
 Dose:  290 mcg Take 1 Cap by mouth Daily (before breakfast). Quantity:  30 Cap Refills:  5  
     
   
   
   
  
 morphine 30 mg SR capsule Commonly known as:  AVINza Your last dose was: Your next dose is:    
   
   
 Dose:  30 mg Take 30 mg by mouth every twelve (12) hours as needed. Rx  Jude Haynes-- Rx bottle visualized 9/13/17 Refills:  0  
     
   
   
   
  
 NASONEX 50 mcg/actuation nasal spray Generic drug:  mometasone Your last dose was: Your next dose is:    
   
   
 Dose:  2 Spray 2 Sprays by Both Nostrils route nightly. Refills:  0  
     
   
   
   
  
 omeprazole 20 mg capsule Commonly known as:  PRILOSEC Your last dose was: Your next dose is:    
   
   
 Dose:  20 mg Take 20 mg by mouth every morning. Refills:  0 oxyCODONE-acetaminophen  mg per tablet Commonly known as:  PERCOCET 10 Your last dose was: Your next dose is:    
   
   
 Dose:  1 Tab Take 1 Tab by mouth every four (4) hours as needed. Rx- Dr Tri Torres- visualized Rx bottle 9/13/17 Refills:  0 PROAIR HFA 90 mcg/actuation inhaler Generic drug:  albuterol Your last dose was: Your next dose is:    
   
   
 Dose:  1-2 Puff Take 1-2 Puffs by inhalation every six (6) hours as needed. Refills:  0  
     
   
   
   
  
 tiZANidine 4 mg tablet Commonly known as:  Milly Lord Your last dose was: Your next dose is:    
   
   
 Dose:  4 mg Take 4 mg by mouth three (3) times daily as needed. Refills:  0 Discharge Instructions KYPHOPLASTY DISCHARGE INSTRUCTIONS General Information: This procedure is done to help with the back pain that is associated with compression fractures in the spine. The kyphoplasty involves placing a balloon into the space of the vertebrae that is fractured, blowing up the balloon, therefore realigning the broken pieces of bone, and then injecting cement into the space to strengthen the vertebrae. The pain experienced from compression fractures is caused by the vertebrae not being stabilized. The cement stabilizes the bone, therefore reducing the pain. Home Care Instructions: You can resume your regular diet and medication regimen. Do not drink alcohol, drive, or make any important legal decisions in the next 24 hours. Do not lift anything heavier than a gallon of milk, or do anything strenuous for the next 24 hours. You will notice a dressing on your lower back after your procedure. This dressing can be removed in 24 hours. Showering is acceptable in 24 hours, but you should refrain from tub baths or swimming for 5 days.   
 
Call If: 
 You should call your Physician if you have any bleeding other than a small spot on your bandage. Call if you have any signs of infection, fever, or increased pain at the site. Call if you should have new or worsening pain in your back, or if you lose control of your bladder or bowel. Any tingling or loss of feeling or movement in your legs should also be reported. Follow-Up Instructions: Please see your ordering doctor as he has requested. To Reach Us:  [employer] [phone] After general anesthesia or intravenous sedation, for 24 hours or while taking prescription Narcotics: · Limit your activities · Do not drive and operate hazardous machinery · Do not make important personal or business decisions · Do  not drink alcoholic beverages · If you have not urinated within 8 hours after discharge, please contact your surgeon on call. *  Please give a list of your current medications to your Primary Care Provider. *  Please update this list whenever your medications are discontinued, doses are 
    changed, or new medications (including over-the-counter products) are added. *  Please carry medication information at all times in case of emergency situations. These are general instructions for a healthy lifestyle: No smoking/ No tobacco products/ Avoid exposure to second hand smoke Surgeon General's Warning:  Quitting smoking now greatly reduces serious risk to your health. Obesity, smoking, and sedentary lifestyle greatly increases your risk for illness A healthy diet, regular physical exercise & weight monitoring are important for maintaining a healthy lifestyle You may be retaining fluid if you have a history of heart failure or if you experience any of the following symptoms:  Weight gain of 3 pounds or more overnight or 5 pounds in a week, increased swelling in our hands or feet or shortness of breath while lying flat in bed.   Please call your doctor as soon as you notice any of these symptoms; do not wait until your next office visit. Recognize signs and symptoms of STROKE: 
 
F-face looks uneven A-arms unable to move or move unevenly S-speech slurred or non-existent T-time-call 911 as soon as signs and symptoms begin-DO NOT go Back to bed or wait to see if you get better-TIME IS BRAIN. Discharge Orders None Introducing hospitals & [facility] SERVICES! Wendy Tierney introduces [facility] patient portal. Now you can access parts of your medical record, email your doctor's office, and request medication refills online. 1. In your internet browser, go to [email or web address] 2. Click on the First Time User? Click Here link in the Sign In box. You will see the New Member Sign Up page. 3. Enter your [facility] Access Code exactly as it appears below. You will not need to use this code after youve completed the sign-up process. If you do not sign up before the expiration date, you must request a new code. · [facility] Access Code: [email or web address] Expires: 10/16/2017  3:30 PM 
 
4. Enter the last four digits of your Social Security Number (xxxx) and Date of Birth (mm/dd/yyyy) as indicated and click Submit. You will be taken to the next sign-up page. 5. Create a [facility] ID. This will be your [facility] login ID and cannot be changed, so think of one that is secure and easy to remember. 6. Create a [facility] password. You can change your password at any time. 7. Enter your Password Reset Question and Answer. This can be used at a later time if you forget your password. 8. Enter your e-mail address. You will receive e-mail notification when new information is available in [address] 9. Click Sign Up. You can now view and download portions of your medical record. 10. Click the Download Summary menu link to download a portable copy of your medical information. If you have questions, please visit the Frequently Asked Questions section of the MyChart website. Remember, MyChart is NOT to be used for urgent needs. For medical emergencies, dial 911. Now available from your iPhone and Android! General Information Please provide this summary of care documentation to your next provider. Patient Signature:  ____________________________________________________________ Date:  ____________________________________________________________  
  
Sara Oka Provider Signature:  ____________________________________________________________ Date:  ____________________________________________________________

## 2017-09-18 NOTE — ANESTHESIA POSTPROCEDURE EVALUATION
Post-Anesthesia Evaluation and Assessment    Patient: Mara Ram MRN: [de-identified]  SSN: [id number]    YOB: 1960  Age: 62 y.o. Sex: female       Cardiovascular Function/Vital Signs  Visit Vitals    BP (!) 168/92    Pulse 84    Temp 36.5 °C (97.7 °F)    Resp 15    Ht 5' 0.75\" (1.543 m)    Wt 101.2 kg (223 lb 3 oz)    SpO2 90%    BMI 42.52 kg/m2       Patient is status post general anesthesia for Procedure(s):  KYPHOPLASTY T12 & L4. Nausea/Vomiting: None    Postoperative hydration reviewed and adequate. Pain:  Pain Scale 1: Visual (09/18/17 1622)  Pain Intensity 1: 0 (09/18/17 1622)   Managed    Neurological Status:   Neuro (WDL): Exceptions to WDL (09/18/17 1622)  Neuro  Neurologic State: Drowsy (09/18/17 1622)  LUE Motor Response: Purposeful (09/18/17 1622)  LLE Motor Response: Purposeful (09/18/17 1622)  RUE Motor Response: Purposeful (09/18/17 1622)  RLE Motor Response: Purposeful (09/18/17 1622)   At baseline    Mental Status and Level of Consciousness: Arousable    Pulmonary Status:   O2 Device: Room air (09/18/17 1707)   Adequate oxygenation and airway patent    Complications related to anesthesia: None    Post-anesthesia assessment completed.  No concerns    Signed By: Zora Cheng MD     September 18, 2017

## 2017-09-18 NOTE — DISCHARGE INSTRUCTIONS
KYPHOPLASTY DISCHARGE INSTRUCTIONS    General Information: This procedure is done to help with the back pain that is associated with compression fractures in the spine. The kyphoplasty involves placing a balloon into the space of the vertebrae that is fractured, blowing up the balloon, therefore realigning the broken pieces of bone, and then injecting cement into the space to strengthen the vertebrae. The pain experienced from compression fractures is caused by the vertebrae not being stabilized. The cement stabilizes the bone, therefore reducing the pain. Home Care Instructions: You can resume your regular diet and medication regimen. Do not drink alcohol, drive, or make any important legal decisions in the next 24 hours. Do not lift anything heavier than a gallon of milk, or do anything strenuous for the next 24 hours. You will notice a dressing on your lower back after your procedure. This dressing can be removed in 24 hours. Showering is acceptable in 24 hours, but you should refrain from tub baths or swimming for 5 days. Call If:     You should call your Physician if you have any bleeding other than a small spot on your bandage. Call if you have any signs of infection, fever, or increased pain at the site. Call if you should have new or worsening pain in your back, or if you lose control of your bladder or bowel. Any tingling or loss of feeling or movement in your legs should also be reported. Follow-Up Instructions: Please see your ordering doctor as he has requested. To Reach Us:  [employer] [phone]  After general anesthesia or intravenous sedation, for 24 hours or while taking prescription Narcotics:  · Limit your activities  · Do not drive and operate hazardous machinery  · Do not make important personal or business decisions  · Do  not drink alcoholic beverages  · If you have not urinated within 8 hours after discharge, please contact your surgeon on call.     * Please give a list of your current medications to your Primary Care Provider. *  Please update this list whenever your medications are discontinued, doses are      changed, or new medications (including over-the-counter products) are added. *  Please carry medication information at all times in case of emergency situations. These are general instructions for a healthy lifestyle:  No smoking/ No tobacco products/ Avoid exposure to second hand smoke  Surgeon General's Warning:  Quitting smoking now greatly reduces serious risk to your health. Obesity, smoking, and sedentary lifestyle greatly increases your risk for illness  A healthy diet, regular physical exercise & weight monitoring are important for maintaining a healthy lifestyle    You may be retaining fluid if you have a history of heart failure or if you experience any of the following symptoms:  Weight gain of 3 pounds or more overnight or 5 pounds in a week, increased swelling in our hands or feet or shortness of breath while lying flat in bed. Please call your doctor as soon as you notice any of these symptoms; do not wait until your next office visit. Recognize signs and symptoms of STROKE:    F-face looks uneven    A-arms unable to move or move unevenly    S-speech slurred or non-existent    T-time-call 911 as soon as signs and symptoms begin-DO NOT go       Back to bed or wait to see if you get better-TIME IS BRAIN.

## 2022-03-19 PROBLEM — M80.08XA VERTEBRAL FRACTURE, OSTEOPOROTIC (HCC): Status: ACTIVE | Noted: 2017-07-24

## 2023-01-25 RX ORDER — ASPIRIN 81 MG/1
81 TABLET ORAL DAILY
COMMUNITY
End: 2023-01-26

## 2023-01-25 RX ORDER — BACLOFEN 10 MG/1
10 TABLET ORAL 3 TIMES DAILY
COMMUNITY

## 2023-01-25 RX ORDER — METOPROLOL SUCCINATE 25 MG/1
25 TABLET, EXTENDED RELEASE ORAL DAILY
COMMUNITY

## 2023-01-25 RX ORDER — NALOXONE HYDROCHLORIDE 0.4 MG/ML
0.4 INJECTION, SOLUTION INTRAMUSCULAR; INTRAVENOUS; SUBCUTANEOUS PRN
COMMUNITY

## 2023-01-25 RX ORDER — MEMANTINE HYDROCHLORIDE 10 MG/1
10 TABLET ORAL 2 TIMES DAILY
COMMUNITY

## 2023-01-25 RX ORDER — FLUCONAZOLE 200 MG/1
200 TABLET ORAL DAILY
COMMUNITY
End: 2023-01-25

## 2023-01-25 RX ORDER — OXYCODONE HYDROCHLORIDE 15 MG/1
15 TABLET ORAL EVERY 4 HOURS PRN
COMMUNITY

## 2023-01-25 RX ORDER — ONDANSETRON 4 MG/1
4 TABLET, FILM COATED ORAL EVERY 8 HOURS PRN
COMMUNITY
End: 2023-01-26

## 2023-01-25 RX ORDER — LISINOPRIL 2.5 MG/1
2.5 TABLET ORAL DAILY
COMMUNITY
End: 2023-01-26

## 2023-01-25 RX ORDER — MAGNESIUM 30 MG
30 TABLET ORAL 2 TIMES DAILY
COMMUNITY
End: 2023-01-26

## 2023-01-25 RX ORDER — OMEPRAZOLE 40 MG/1
40 CAPSULE, DELAYED RELEASE ORAL DAILY
COMMUNITY

## 2023-01-25 RX ORDER — GABAPENTIN 600 MG/1
600 TABLET ORAL 3 TIMES DAILY
COMMUNITY

## 2023-01-25 RX ORDER — MORPHINE SULFATE 15 MG/1
15 TABLET ORAL EVERY 4 HOURS PRN
COMMUNITY

## 2023-01-25 RX ORDER — NYSTATIN 100000 U/G
OINTMENT TOPICAL 2 TIMES DAILY
COMMUNITY

## 2023-01-25 RX ORDER — TERIPARATIDE 250 UG/ML
20 INJECTION, SOLUTION SUBCUTANEOUS DAILY
COMMUNITY

## 2023-01-25 RX ORDER — SIMVASTATIN 40 MG
40 TABLET ORAL NIGHTLY
COMMUNITY

## 2023-01-26 ENCOUNTER — OFFICE VISIT (OUTPATIENT)
Dept: NEUROSURGERY | Age: 63
End: 2023-01-26
Payer: COMMERCIAL

## 2023-01-26 VITALS
WEIGHT: 192 LBS | OXYGEN SATURATION: 90 % | SYSTOLIC BLOOD PRESSURE: 109 MMHG | DIASTOLIC BLOOD PRESSURE: 70 MMHG | BODY MASS INDEX: 38.71 KG/M2 | TEMPERATURE: 97.2 F | HEART RATE: 94 BPM | HEIGHT: 59 IN

## 2023-01-26 DIAGNOSIS — Z72.0 TOBACCO ABUSE: ICD-10-CM

## 2023-01-26 DIAGNOSIS — S34.109A CLOSED FRACTURE OF LUMBAR VERTEBRA WITH SPINAL CORD INJURY, INITIAL ENCOUNTER (HCC): Primary | ICD-10-CM

## 2023-01-26 DIAGNOSIS — M81.0 OSTEOPOROSIS, UNSPECIFIED OSTEOPOROSIS TYPE, UNSPECIFIED PATHOLOGICAL FRACTURE PRESENCE: ICD-10-CM

## 2023-01-26 DIAGNOSIS — E66.9 OBESITY (BMI 30-39.9): ICD-10-CM

## 2023-01-26 DIAGNOSIS — S32.008A CLOSED FRACTURE OF LUMBAR VERTEBRA WITH SPINAL CORD INJURY, INITIAL ENCOUNTER (HCC): Primary | ICD-10-CM

## 2023-01-26 PROCEDURE — 99204 OFFICE O/P NEW MOD 45 MIN: CPT | Performed by: NEUROLOGICAL SURGERY

## 2023-01-26 RX ORDER — ALBUTEROL SULFATE 90 UG/1
1-2 AEROSOL, METERED RESPIRATORY (INHALATION) PRN
COMMUNITY

## 2023-01-26 RX ORDER — IPRATROPIUM BROMIDE AND ALBUTEROL SULFATE 2.5; .5 MG/3ML; MG/3ML
3 SOLUTION RESPIRATORY (INHALATION) PRN
COMMUNITY
Start: 2021-10-24

## 2023-01-26 RX ORDER — LEVOCETIRIZINE DIHYDROCHLORIDE 5 MG/1
4 TABLET, FILM COATED ORAL DAILY
COMMUNITY

## 2023-01-26 ASSESSMENT — PATIENT HEALTH QUESTIONNAIRE - PHQ9
2. FEELING DOWN, DEPRESSED OR HOPELESS: 0
SUM OF ALL RESPONSES TO PHQ QUESTIONS 1-9: 0
SUM OF ALL RESPONSES TO PHQ9 QUESTIONS 1 & 2: 0
SUM OF ALL RESPONSES TO PHQ QUESTIONS 1-9: 0
SUM OF ALL RESPONSES TO PHQ QUESTIONS 1-9: 0
1. LITTLE INTEREST OR PLEASURE IN DOING THINGS: 0
SUM OF ALL RESPONSES TO PHQ QUESTIONS 1-9: 0

## 2023-01-26 NOTE — PROGRESS NOTES
History of Present Illness    Patient Words: 58 y.o. This patient is a 58 y.o. female who presents today for evaluation of chronic low back pain. She has a history of osteoporosis tobacco abuse multiple previous kyphoplasty's. Thoracic and lumbar vertebral body compression fractures. She denies any injury but did describe some recurrent back pain again. Lumbar MRI scan shows a possible small linear area of indentation of the superior endplate of L5 however the STIR images are largely unremarkable to my review.   Past Medical History:   Diagnosis Date    Anxiety and depression     Bronchitis     Constipation     CTS (carpal tunnel syndrome)     GERD (gastroesophageal reflux disease)     High cholesterol     Hypertension         Allergies   Allergen Reactions    Prednisone Shortness Of Breath    Propoxyphene Shortness Of Breath    Sulfa Antibiotics Shortness Of Breath    Tramadol Other (See Comments) and Shortness Of Breath    Doxycycline Nausea And Vomiting and Other (See Comments)    Varenicline Other (See Comments)     Severe dreams  Severe dreams  Severe dreams  Severe dreams  Severe dreams  Severe dreams          Family History   Problem Relation Age of Onset    Heart Failure Father         Social History     Socioeconomic History    Marital status:      Spouse name: Not on file    Number of children: Not on file    Years of education: Not on file    Highest education level: Not on file   Occupational History    Not on file   Tobacco Use    Smoking status: Every Day     Packs/day: 1.00     Years: 40.00     Pack years: 40.00     Types: Cigarettes, Cigars    Smokeless tobacco: Not on file   Vaping Use    Vaping Use: Every day   Substance and Sexual Activity    Alcohol use: Yes    Drug use: Not on file    Sexual activity: Not on file   Other Topics Concern    Not on file   Social History Narrative    Not on file     Social Determinants of Health     Financial Resource Strain: Not on file   Food Insecurity: Not on file   Transportation Needs: Not on file   Physical Activity: Not on file   Stress: Not on file   Social Connections: Not on file   Intimate Partner Violence: Not on file   Housing Stability: Not on file       Current Outpatient Medications   Medication Sig Dispense Refill    ipratropium-albuterol (DUONEB) 0.5-2.5 (3) MG/3ML SOLN nebulizer solution Inhale 3 mLs into the lungs as needed      oxyCODONE (OXY-IR) 15 MG immediate release tablet Take 15 mg by mouth every 4 hours as needed for Pain.      gabapentin (NEURONTIN) 600 MG tablet Take 600 mg by mouth 3 times daily. baclofen (LIORESAL) 10 MG tablet Take 10 mg by mouth 3 times daily      Teriparatide, Recombinant, (FORTEO) 600 MCG/2.4ML SOPN injection Inject 20 mcg into the skin daily      metoprolol succinate (TOPROL XL) 25 MG extended release tablet Take 25 mg by mouth daily      simvastatin (ZOCOR) 40 MG tablet Take 40 mg by mouth nightly      omeprazole (PRILOSEC) 40 MG delayed release capsule Take 40 mg by mouth daily      MECLIZINE HCL PO Take by mouth      linaCLOtide (LINZESS PO) Take by mouth      nystatin (MYCOSTATIN) 476779 UNIT/GM ointment Apply topically 2 times daily Apply topically 2 times daily. memantine (NAMENDA) 10 MG tablet Take 10 mg by mouth 2 times daily      Fluticasone-Umeclidin-Vilant (TRELEGY ELLIPTA IN) Inhale into the lungs      naloxone (NARCAN) 0.4 MG/ML injection Infuse 0.4 mg intravenously as needed      morphine (MSIR) 15 MG tablet Take 15 mg by mouth every 4 hours as needed for Pain.      levocetirizine (XYZAL ALLERGY 24HR) 5 MG tablet Take 4 mg by mouth daily      albuterol sulfate HFA (PROVENTIL;VENTOLIN;PROAIR) 108 (90 Base) MCG/ACT inhaler Inhale 1-2 puffs into the lungs as needed      Cholecalciferol 50 MCG (2000 UT) CAPS Take 2,000 Units by mouth daily       No current facility-administered medications for this visit.        Patient Active Problem List   Diagnosis    Anxiety disorder Constipation    COPD (chronic obstructive pulmonary disease) (HCC)    Tobacco abuse    Backache    Tinea corporis    Hypokalemia    Insomnia, unspecified    Encounter for long-term (current) use of other medications    Mixed hyperlipidemia    Vertebral fracture, osteoporotic (HCC)    Depressive disorder    Closed fracture of lumbar vertebra with spinal cord injury ([facility]    Osteoporosis    Obesity (BMI 30-39. 9)        Review of Systems: A complete ROS was done and as stated in the HPI or otherwise negative. /70   Pulse 94   Temp 97.2 °F (36.2 °C) (Temporal)   Ht 4' 11\" (1.499 m)   Wt 192 lb (87.1 kg)   SpO2 90%   BMI 38.78 kg/m²        Physical Exam  The physical exam findings are as follows:      Cranial Nerves:   Intact visual fields. Fundi are benign. ROYCE, EOM's full, no nystagmus, no ptosis. Facial sensation is normal. Corneal reflexes are intact. Facial movement is symmetric. Hearing is normal bilaterally. Palate is midline with normal sternocleidomastoid and trapezius muscles are normal. Tongue is midline. Motor:  5/5 strength in upper and lower proximal and distal muscles. Normal bulk and tone. No fasciculations. Reflexes:   Deep tendon reflexes 2+/4 and symmetrical.   Sensory:   Normal to touch, pinprick and vibration. Gait:  Normal gait. Tremor:   No tremor noted. Cerebellar:  No cerebellar signs present. Significant thoracic kyphosis        Assessment & Plan      ICD-10-CM    1. Closed fracture of lumbar vertebra with spinal cord injury, initial encounter ([facility]  S34.109A     S32.008A       2. Tobacco abuse  Z72.0       3. Osteoporosis, unspecified osteoporosis type, unspecified pathological fracture presence  M81.0       4. Obesity (BMI 30-39. 9)  E66.9       At the present time I do not feel that kyphoplasty is indicated. She will follow-up with pain management. No follow-up indicated here at this time.       Lyndon Kaur MD

## (undated) DEVICE — 3M™ TEGADERM™ TRANSPARENT FILM DRESSING FRAME STYLE, 1624W, 2-3/8 IN X 2-3/4 IN (6 CM X 7 CM), 100/CT 4CT/CASE: Brand: 3M™ TEGADERM™

## (undated) DEVICE — (D)PREP SKN CHLRAPRP APPL 26ML -- CONVERT TO ITEM 371833

## (undated) DEVICE — DRAPE XR C ARM 41X74IN LF --

## (undated) DEVICE — KENDALL SCD EXPRESS SLEEVES, KNEE LENGTH, MEDIUM: Brand: KENDALL SCD

## (undated) DEVICE — SOLUTION IV 1000ML 0.9% SOD CHL

## (undated) DEVICE — INTENDED FOR TISSUE SEPARATION, AND OTHER PROCEDURES THAT REQUIRE A SHARP SURGICAL BLADE TO PUNCTURE OR CUT.: Brand: BARD-PARKER SAFETY BLADES SIZE 15, STERILE

## (undated) DEVICE — AMD ANTIMICROBIAL NON-ADHERENT PAD,0.2% POLYHEXAMETHYLENE BIGUANIDE HCI (PHMB): Brand: TELFA

## (undated) DEVICE — PAD,NON-ADHERENT,3X8,STERILE,LF,1/PK: Brand: MEDLINE

## (undated) DEVICE — BONE TAMP KIT KPX203PB FF X2 20/3 1 STP: Brand: KYPHOPAK™ TRAY

## (undated) DEVICE — BONE TAMP KIT KPX153NB AF X2 15/3

## (undated) DEVICE — BONE TAMP KIT KEX102NB AF E2 10/2: Brand: KYPHON EXPRESS II KYPHOPAK TRAY

## (undated) DEVICE — KIT KPE1001 KYPAK EXP TRY 10/2 FF W/EOIS: Brand: KYPHOPAK™ EXPRESS™ TRAY

## (undated) DEVICE — SYR LR LCK 1ML GRAD NSAF 30ML --

## (undated) DEVICE — 1010 S-DRAPE TOWEL DRAPE 10/BX: Brand: STERI-DRAPE™

## (undated) DEVICE — BONE TAMP KIT KPX153PB FF X2 15/3 1 STP: Brand: KYPHOPAK™ TRAY

## (undated) DEVICE — NEEDLE HYPO 21GA L1.5IN INTRAMUSCULAR S STL LATCH BVL UP

## (undated) DEVICE — DRAPE SHT 3 QTR PROXIMA 53X77 --

## (undated) DEVICE — BONE BIOPSY DEVICE F05A BBD SIZE 3: Brand: MEDTRONIC REUSABLE INSTRUMENTS

## (undated) DEVICE — PACK PROCEDURE SURG POST LAMINECTOMY CDS

## (undated) DEVICE — BONE BIOPSY DEVICE F07A TAPERED SIZE 2: Brand: MEDTRONIC REUSABLE INSTRUMENTS